# Patient Record
Sex: FEMALE | Race: WHITE | NOT HISPANIC OR LATINO
[De-identification: names, ages, dates, MRNs, and addresses within clinical notes are randomized per-mention and may not be internally consistent; named-entity substitution may affect disease eponyms.]

---

## 2017-06-19 PROBLEM — Z00.00 ENCOUNTER FOR PREVENTIVE HEALTH EXAMINATION: Status: ACTIVE | Noted: 2017-06-19

## 2017-08-01 ENCOUNTER — APPOINTMENT (OUTPATIENT)
Dept: OBGYN | Facility: CLINIC | Age: 72
End: 2017-08-01
Payer: MEDICARE

## 2017-08-01 VITALS
HEART RATE: 58 BPM | SYSTOLIC BLOOD PRESSURE: 138 MMHG | WEIGHT: 174 LBS | RESPIRATION RATE: 16 BRPM | TEMPERATURE: 98.1 F | DIASTOLIC BLOOD PRESSURE: 70 MMHG | BODY MASS INDEX: 29.71 KG/M2 | OXYGEN SATURATION: 98 % | HEIGHT: 64 IN

## 2017-08-01 DIAGNOSIS — I10 ESSENTIAL (PRIMARY) HYPERTENSION: ICD-10-CM

## 2017-08-01 DIAGNOSIS — E07.9 DISORDER OF THYROID, UNSPECIFIED: ICD-10-CM

## 2017-08-01 DIAGNOSIS — Z78.9 OTHER SPECIFIED HEALTH STATUS: ICD-10-CM

## 2017-08-01 DIAGNOSIS — Z85.3 PERSONAL HISTORY OF MALIGNANT NEOPLASM OF BREAST: ICD-10-CM

## 2017-08-01 DIAGNOSIS — F17.210 NICOTINE DEPENDENCE, CIGARETTES, UNCOMPLICATED: ICD-10-CM

## 2017-08-01 DIAGNOSIS — Z85.828 PERSONAL HISTORY OF OTHER MALIGNANT NEOPLASM OF SKIN: ICD-10-CM

## 2017-08-01 DIAGNOSIS — F19.90 OTHER PSYCHOACTIVE SUBSTANCE USE, UNSPECIFIED, UNCOMPLICATED: ICD-10-CM

## 2017-08-01 PROCEDURE — G0101: CPT

## 2017-08-01 PROCEDURE — 82270 OCCULT BLOOD FECES: CPT

## 2017-08-01 PROCEDURE — 81003 URINALYSIS AUTO W/O SCOPE: CPT | Mod: QW

## 2017-08-03 PROBLEM — I10 HIGH BLOOD PRESSURE: Status: ACTIVE | Noted: 2017-08-01

## 2017-08-03 PROBLEM — Z85.828 HISTORY OF MALIGNANT NEOPLASM OF SKIN: Status: RESOLVED | Noted: 2017-08-01 | Resolved: 2017-08-03

## 2017-08-03 PROBLEM — E07.9 THYROID DISEASE: Status: ACTIVE | Noted: 2017-08-01

## 2017-08-03 PROBLEM — F19.90 DRUG USE: Status: ACTIVE | Noted: 2017-08-01

## 2017-08-03 PROBLEM — Z78.9 NON-SMOKER: Status: ACTIVE | Noted: 2017-08-01

## 2017-08-03 PROBLEM — F17.210 CIGARETTE SMOKER: Status: ACTIVE | Noted: 2017-08-01

## 2017-08-03 PROBLEM — Z85.3 HISTORY OF MALIGNANT NEOPLASM OF BREAST: Status: RESOLVED | Noted: 2017-08-01 | Resolved: 2017-08-03

## 2017-08-27 LAB
BILIRUB UR QL STRIP: NORMAL
CLARITY UR: CLEAR
COLLECTION METHOD: NORMAL
CYTOLOGY CVX/VAG DOC THIN PREP: NORMAL
GLUCOSE UR-MCNC: NORMAL
HCG UR QL: 0.2 EU/DL
HGB UR QL STRIP.AUTO: NORMAL
KETONES UR-MCNC: NORMAL
LEUKOCYTE ESTERASE UR QL STRIP: NORMAL
NITRITE UR QL STRIP: NORMAL
PH UR STRIP: 7.5
PROT UR STRIP-MCNC: NORMAL
SP GR UR STRIP: 1.02

## 2017-09-27 ENCOUNTER — EMERGENCY (EMERGENCY)
Facility: HOSPITAL | Age: 72
LOS: 1 days | Discharge: HOME | End: 2017-09-27
Attending: EMERGENCY MEDICINE
Payer: SELF-PAY

## 2017-09-27 VITALS
WEIGHT: 175.05 LBS | HEIGHT: 64 IN | SYSTOLIC BLOOD PRESSURE: 152 MMHG | OXYGEN SATURATION: 96 % | HEART RATE: 60 BPM | RESPIRATION RATE: 18 BRPM | DIASTOLIC BLOOD PRESSURE: 80 MMHG | TEMPERATURE: 98 F

## 2017-09-27 PROCEDURE — 70450 CT HEAD/BRAIN W/O DYE: CPT | Mod: 26

## 2017-09-27 PROCEDURE — 99284 EMERGENCY DEPT VISIT MOD MDM: CPT | Mod: 25

## 2017-09-27 PROCEDURE — 72125 CT NECK SPINE W/O DYE: CPT | Mod: 26

## 2017-09-27 PROCEDURE — 99284 EMERGENCY DEPT VISIT MOD MDM: CPT

## 2017-09-27 PROCEDURE — 72125 CT NECK SPINE W/O DYE: CPT

## 2017-09-27 PROCEDURE — 70450 CT HEAD/BRAIN W/O DYE: CPT

## 2017-09-27 RX ORDER — IBUPROFEN 200 MG
600 TABLET ORAL ONCE
Qty: 0 | Refills: 0 | Status: COMPLETED | OUTPATIENT
Start: 2017-09-27 | End: 2017-09-27

## 2017-09-27 RX ADMIN — Medication 600 MILLIGRAM(S): at 20:20

## 2017-09-27 NOTE — ED ADULT TRIAGE NOTE - CHIEF COMPLAINT QUOTE
pt BIBA from urgent care, pt is AOX3, reports she was sitting in a chair and it broke. no loc, no thinners, c-collar cleared by ER MD on arrival. pt c/o of back pain. states she drove herself to urgent care and was brought to ED for eval.

## 2017-09-27 NOTE — ED STATDOCS - ATTENDING CONTRIBUTION TO CARE
I, Humble Do, performed the initial face to face bedside interview with this patient regarding history of present illness, review of symptoms and relevant past medical, social and family history.  I completed an independent physical examination.  I was the initial provider who evaluated this patient. I have signed out the follow up of any pending tests (i.e. labs, radiological studies) to the ACP.  I have communicated the patient’s plan of care and disposition with the ACP.

## 2017-09-27 NOTE — ED STATDOCS - OBJECTIVE STATEMENT
70 y/o M presents to the ED c/o back pain which onset at 1315. She describes her pain as a 7 or 8 out of 10. She states she fell at work and hit her head on the gym floor. She states they did not let her go home. She went to Urgent Care and the doctor told her to come here. She hit her head so hard she thinks she may have cracked her neck. They placed a C-collar on her. She states she has consistently had back problems. She had ankle spondylitis. She states she had a cervical injection 2 months ago. Pt denies fevers, chills, weakness, and LOC. No further complaints at this time.

## 2017-09-27 NOTE — ED STATDOCS - PROGRESS NOTE DETAILS
co pain to neck sp fall today. seen at [facility] and sent to er. pe lungs cta heart rrr cspine tender posteriorly, from with tenderness. nuero intact. ct no acute findings, rx motrin, fu pmd results given

## 2017-09-28 RX ORDER — IBUPROFEN 200 MG
1 TABLET ORAL
Qty: 15 | Refills: 0 | OUTPATIENT
Start: 2017-09-28 | End: 2017-10-02

## 2018-07-28 PROBLEM — Z78.9 ALCOHOL USE: Status: ACTIVE | Noted: 2017-08-01

## 2018-08-27 ENCOUNTER — APPOINTMENT (OUTPATIENT)
Dept: OBGYN | Facility: CLINIC | Age: 73
End: 2018-08-27
Payer: MEDICARE

## 2018-08-27 VITALS
HEIGHT: 64 IN | SYSTOLIC BLOOD PRESSURE: 180 MMHG | TEMPERATURE: 98.5 F | BODY MASS INDEX: 28.85 KG/M2 | RESPIRATION RATE: 16 BRPM | WEIGHT: 169 LBS | DIASTOLIC BLOOD PRESSURE: 60 MMHG

## 2018-08-27 VITALS — DIASTOLIC BLOOD PRESSURE: 72 MMHG | SYSTOLIC BLOOD PRESSURE: 160 MMHG

## 2018-08-27 DIAGNOSIS — Z85.3 PERSONAL HISTORY OF MALIGNANT NEOPLASM OF BREAST: ICD-10-CM

## 2018-08-27 LAB
BILIRUB UR QL STRIP: NORMAL
CLARITY UR: CLEAR
COLLECTION METHOD: NORMAL
GLUCOSE UR-MCNC: NORMAL
HCG UR QL: 0.2 EU/DL
HGB UR QL STRIP.AUTO: NORMAL
KETONES UR-MCNC: NORMAL
LEUKOCYTE ESTERASE UR QL STRIP: NORMAL
NITRITE UR QL STRIP: NORMAL
PH UR STRIP: 7.5
PROT UR STRIP-MCNC: 100
SP GR UR STRIP: 1.02

## 2018-08-27 PROCEDURE — G0101: CPT

## 2018-08-27 PROCEDURE — 82270 OCCULT BLOOD FECES: CPT

## 2018-08-27 PROCEDURE — 81003 URINALYSIS AUTO W/O SCOPE: CPT | Mod: QW

## 2018-08-30 LAB — CYTOLOGY CVX/VAG DOC THIN PREP: NORMAL

## 2019-04-22 ENCOUNTER — OUTPATIENT (OUTPATIENT)
Dept: OUTPATIENT SERVICES | Facility: HOSPITAL | Age: 74
LOS: 1 days | End: 2019-04-22
Payer: MEDICARE

## 2019-04-22 VITALS
WEIGHT: 172.84 LBS | DIASTOLIC BLOOD PRESSURE: 68 MMHG | SYSTOLIC BLOOD PRESSURE: 141 MMHG | HEIGHT: 60 IN | TEMPERATURE: 98 F | HEART RATE: 54 BPM | RESPIRATION RATE: 16 BRPM

## 2019-04-22 DIAGNOSIS — I10 ESSENTIAL (PRIMARY) HYPERTENSION: ICD-10-CM

## 2019-04-22 DIAGNOSIS — Z90.49 ACQUIRED ABSENCE OF OTHER SPECIFIED PARTS OF DIGESTIVE TRACT: Chronic | ICD-10-CM

## 2019-04-22 DIAGNOSIS — N32.1 VESICOINTESTINAL FISTULA: ICD-10-CM

## 2019-04-22 DIAGNOSIS — Z01.818 ENCOUNTER FOR OTHER PREPROCEDURAL EXAMINATION: ICD-10-CM

## 2019-04-22 DIAGNOSIS — Z29.9 ENCOUNTER FOR PROPHYLACTIC MEASURES, UNSPECIFIED: ICD-10-CM

## 2019-04-22 DIAGNOSIS — Z98.890 OTHER SPECIFIED POSTPROCEDURAL STATES: Chronic | ICD-10-CM

## 2019-04-22 DIAGNOSIS — Z90.710 ACQUIRED ABSENCE OF BOTH CERVIX AND UTERUS: Chronic | ICD-10-CM

## 2019-04-22 LAB
ANION GAP SERPL CALC-SCNC: 13 MMOL/L — SIGNIFICANT CHANGE UP (ref 5–17)
BLD GP AB SCN SERPL QL: SIGNIFICANT CHANGE UP
BUN SERPL-MCNC: 13 MG/DL — SIGNIFICANT CHANGE UP (ref 8–20)
CALCIUM SERPL-MCNC: 9.4 MG/DL — SIGNIFICANT CHANGE UP (ref 8.6–10.2)
CHLORIDE SERPL-SCNC: 98 MMOL/L — SIGNIFICANT CHANGE UP (ref 98–107)
CO2 SERPL-SCNC: 25 MMOL/L — SIGNIFICANT CHANGE UP (ref 22–29)
CREAT SERPL-MCNC: 0.91 MG/DL — SIGNIFICANT CHANGE UP (ref 0.5–1.3)
GLUCOSE SERPL-MCNC: 110 MG/DL — SIGNIFICANT CHANGE UP (ref 70–115)
HBA1C BLD-MCNC: 6.7 % — HIGH (ref 4–5.6)
HCT VFR BLD CALC: 38 % — SIGNIFICANT CHANGE UP (ref 37–47)
HGB BLD-MCNC: 12.5 G/DL — SIGNIFICANT CHANGE UP (ref 12–16)
MCHC RBC-ENTMCNC: 27.8 PG — SIGNIFICANT CHANGE UP (ref 27–31)
MCHC RBC-ENTMCNC: 32.9 G/DL — SIGNIFICANT CHANGE UP (ref 32–36)
MCV RBC AUTO: 84.6 FL — SIGNIFICANT CHANGE UP (ref 81–99)
MRSA PCR RESULT.: SIGNIFICANT CHANGE UP
PLATELET # BLD AUTO: 284 K/UL — SIGNIFICANT CHANGE UP (ref 150–400)
POTASSIUM SERPL-MCNC: 4.9 MMOL/L — SIGNIFICANT CHANGE UP (ref 3.5–5.3)
POTASSIUM SERPL-SCNC: 4.9 MMOL/L — SIGNIFICANT CHANGE UP (ref 3.5–5.3)
RBC # BLD: 4.49 M/UL — SIGNIFICANT CHANGE UP (ref 4.4–5.2)
RBC # FLD: 14.5 % — SIGNIFICANT CHANGE UP (ref 11–15.6)
S AUREUS DNA NOSE QL NAA+PROBE: SIGNIFICANT CHANGE UP
SODIUM SERPL-SCNC: 136 MMOL/L — SIGNIFICANT CHANGE UP (ref 135–145)
TYPE + AB SCN PNL BLD: SIGNIFICANT CHANGE UP
WBC # BLD: 6.8 K/UL — SIGNIFICANT CHANGE UP (ref 4.8–10.8)
WBC # FLD AUTO: 6.8 K/UL — SIGNIFICANT CHANGE UP (ref 4.8–10.8)

## 2019-04-22 PROCEDURE — 83036 HEMOGLOBIN GLYCOSYLATED A1C: CPT

## 2019-04-22 PROCEDURE — 86900 BLOOD TYPING SEROLOGIC ABO: CPT

## 2019-04-22 PROCEDURE — 36415 COLL VENOUS BLD VENIPUNCTURE: CPT

## 2019-04-22 PROCEDURE — 87640 STAPH A DNA AMP PROBE: CPT

## 2019-04-22 PROCEDURE — 93005 ELECTROCARDIOGRAM TRACING: CPT

## 2019-04-22 PROCEDURE — 85027 COMPLETE CBC AUTOMATED: CPT

## 2019-04-22 PROCEDURE — 87641 MR-STAPH DNA AMP PROBE: CPT

## 2019-04-22 PROCEDURE — 86850 RBC ANTIBODY SCREEN: CPT

## 2019-04-22 PROCEDURE — G0463: CPT

## 2019-04-22 PROCEDURE — 80048 BASIC METABOLIC PNL TOTAL CA: CPT

## 2019-04-22 PROCEDURE — 93010 ELECTROCARDIOGRAM REPORT: CPT

## 2019-04-22 PROCEDURE — 86901 BLOOD TYPING SEROLOGIC RH(D): CPT

## 2019-04-22 RX ORDER — SODIUM CHLORIDE 9 MG/ML
3 INJECTION INTRAMUSCULAR; INTRAVENOUS; SUBCUTANEOUS EVERY 8 HOURS
Qty: 0 | Refills: 0 | Status: DISCONTINUED | OUTPATIENT
Start: 2019-04-30 | End: 2019-05-03

## 2019-04-22 RX ORDER — CEFOTETAN DISODIUM 1 G
2 VIAL (EA) INJECTION ONCE
Qty: 0 | Refills: 0 | Status: COMPLETED | OUTPATIENT
Start: 2019-04-30 | End: 2019-04-30

## 2019-04-22 NOTE — PATIENT PROFILE ADULT - NSPROEDALEARNPREF_GEN_A_NUR
verbal instruction/presurgical , surgical scrub instructions, pain management education given - verbalized understanding/written material

## 2019-04-22 NOTE — H&P PST ADULT - NSICDXPROBLEM_GEN_ALL_CORE_FT
PROBLEM DIAGNOSES  Problem: Need for prophylactic measure  Assessment and Plan: Caprini Score 7 High risk,  Surgical team should assess /Strongly recommend pharmacological and mechanical measures for VTE prophylaxis     Problem: Hypertension  Assessment and Plan: continue medications as instructed. Asked the patient to take the Blood pressure medication/ heart medication on DOP.     Problem: [place]-vesical fistula  Assessment and Plan: Laparoscopic possible open resection of colovesical fistula and repair of umbilical hernia with Parietex mesh and all related procedures. insertion of perioperative bilateral ureteral stents. Medical Clearance pending

## 2019-04-22 NOTE — H&P PST ADULT - NSICDXPASTMEDICALHX_GEN_ALL_CORE_FT
PAST MEDICAL HISTORY:  Breast cancer 2006, surgery, radiation and hormone therapy    Hypertension     Hypothyroidism     Prediabetes

## 2019-04-22 NOTE — H&P PST ADULT - HISTORY OF PRESENT ILLNESS
73 maite who states that she was having frequent UTI in the past and her urologist ran some tests which showed she has a colo vesicular fistula

## 2019-04-22 NOTE — H&P PST ADULT - ASSESSMENT
medications reviewed, instructions given on what medications to take and what not to take. Asked the patient to take the Blood pressure medication/ heart medication on DOP.   CAPRINI VTE 2.0 SCORE [CLOT updated 2019]    AGE RELATED RISK FACTORS                                                       MOBILITY RELATED FACTORS  [ ] Age 41-60 years                                            (1 Point)                    [ ] Bed rest                                                        (1 Point)  [ ] Age: 61-74 years                                           (2 Points)                  [ ] Plaster cast                                                   (2 Points)  [x ] Age= 75 years                                              (3 Points)                    [ ] Bed bound for more than 72 hours                 (2 Points)    DISEASE RELATED RISK FACTORS                                               GENDER SPECIFIC FACTORS  [ ] Edema in the lower extremities                       (1 Point)              [ ] Pregnancy                                                     (1 Point)  [x ] Varicose veins                                               (1 Point)                     [ ] Post-partum < 6 weeks                                   (1 Point)             [x ] BMI > 25 Kg/m2                                            (1 Point)                     [ ] Hormonal therapy  or oral contraception          (1 Point)                 [ ] Sepsis (in the previous month)                        (1 Point)               [ ] History of pregnancy complications                 (1 point)  [ ] Pneumonia or serious lung disease                                               [ ] Unexplained or recurrent                     (1 Point)           (in the previous month)                               (1 Point)  [ ] Abnormal pulmonary function test                     (1 Point)                 SURGERY RELATED RISK FACTORS  [ ] Acute myocardial infarction                              (1 Point)               [ ]  Section                                             (1 Point)  [ ] Congestive heart failure (in the previous month)  (1 Point)      [ ] Minor surgery                                                  (1 Point)   [ ] Inflammatory bowel disease                             (1 Point)               [ ] Arthroscopic surgery                                        (2 Points)  [ ] Central venous access                                      (2 Points)                [ x] General surgery lasting more than 45 minutes (2 points)  [ ] Malignancy- Present or previous                   (2 Points)                [ ] Elective arthroplasty                                         (5 points)    [ ] Stroke (in the previous month)                          (5 Points)                                                                                                                                                           HEMATOLOGY RELATED FACTORS                                                 TRAUMA RELATED RISK FACTORS  [ ] Prior episodes of VTE                                     (3 Points)                [ ] Fracture of the hip, pelvis, or leg                       (5 Points)  [ ] Positive family history for VTE                         (3 Points)             [ ] Acute spinal cord injury (in the previous month)  (5 Points)  [ ] Prothrombin 57729 A                                     (3 Points)               [ ] Paralysis  (less than 1 month)                             (5 Points)  [ ] Factor V Leiden                                             (3 Points)                  [ ] Multiple Trauma within 1 month                        (5 Points)  [ ] Lupus anticoagulants                                     (3 Points)                                                           [ ] Anticardiolipin antibodies                               (3 Points)                                                       [ ] High homocysteine in the blood                      (3 Points)                                             [ ] Other congenital or acquired thrombophilia      (3 Points)                                                [ ] Heparin induced thrombocytopenia                  (3 Points)                                     Total Score [    7      ]  OPIOID RISK TOOL    YARON EACH BOX THAT APPLIES AND ADD TOTALS AT THE END    FAMILY HISTORY OF SUBSTANCE ABUSE                 FEMALE         MALE                                                Alcohol                             [  ]1 pt          [  ]3pts                                               Illegal Drugs                     [  ]2 pts        [  ]3pts                                               Rx Drugs                           [  ]4 pts        [  ]4 pts    PERSONAL HISTORY OF SUBSTANCE ABUSE                                                                                          Alcohol                             [  ]3 pts       [  ]3 pts                                               Illegal Drugs                     [  ]4 pts        [  ]4 pts                                               Rx Drugs                           [  ]5 pts        [  ]5 pts    AGE BETWEEN 16-45 YEARS                                      [  ]1 pt         [  ]1 pt    HISTORY OF PREADOLESCENT   SEXUAL ABUSE                                                             [ x ]3 pts        [  ]0pts    PSYCHOLOGICAL DISEASE                     ADD, OCD, Bipolar, Schizophrenia        [  ]2 pts         [  ]2 pts                      Depression                                               [  ]1 pt           [  ]1 pt           SCORING TOTAL   (add numbers and type here)              (3  )                                     A score of 3 or lower indicated LOW risk for future opioid abuse  A score of 4 to 7 indicated moderate risk for future opioid abuse  A score of 8 or higher indicates a high risk for opioid abuse

## 2019-04-22 NOTE — H&P PST ADULT - NSICDXPASTSURGICALHX_GEN_ALL_CORE_FT
PAST SURGICAL HISTORY:  H/O laminectomy lumbar 1992    H/O total hysterectomy 1987    History of cholecystectomy 1979    History of lumpectomy left 2006

## 2019-04-29 ENCOUNTER — TRANSCRIPTION ENCOUNTER (OUTPATIENT)
Age: 74
End: 2019-04-29

## 2019-04-30 ENCOUNTER — INPATIENT (INPATIENT)
Facility: HOSPITAL | Age: 74
LOS: 2 days | Discharge: HOME | DRG: 330 | End: 2019-05-03
Attending: SURGERY | Admitting: SURGERY
Payer: MEDICARE

## 2019-04-30 ENCOUNTER — RESULT REVIEW (OUTPATIENT)
Age: 74
End: 2019-04-30

## 2019-04-30 VITALS
OXYGEN SATURATION: 97 % | RESPIRATION RATE: 16 BRPM | TEMPERATURE: 98 F | HEIGHT: 60 IN | DIASTOLIC BLOOD PRESSURE: 79 MMHG | WEIGHT: 172.84 LBS | HEART RATE: 62 BPM | SYSTOLIC BLOOD PRESSURE: 149 MMHG

## 2019-04-30 DIAGNOSIS — Z90.710 ACQUIRED ABSENCE OF BOTH CERVIX AND UTERUS: Chronic | ICD-10-CM

## 2019-04-30 DIAGNOSIS — Z90.49 ACQUIRED ABSENCE OF OTHER SPECIFIED PARTS OF DIGESTIVE TRACT: Chronic | ICD-10-CM

## 2019-04-30 DIAGNOSIS — Z98.890 OTHER SPECIFIED POSTPROCEDURAL STATES: Chronic | ICD-10-CM

## 2019-04-30 DIAGNOSIS — N32.1 VESICOINTESTINAL FISTULA: ICD-10-CM

## 2019-04-30 LAB
ABO RH CONFIRMATION: SIGNIFICANT CHANGE UP
GLUCOSE BLDC GLUCOMTR-MCNC: 153 MG/DL — HIGH (ref 70–99)
GLUCOSE BLDC GLUCOMTR-MCNC: 160 MG/DL — HIGH (ref 70–99)
GLUCOSE BLDC GLUCOMTR-MCNC: 197 MG/DL — HIGH (ref 70–99)

## 2019-04-30 PROCEDURE — 88304 TISSUE EXAM BY PATHOLOGIST: CPT | Mod: 26

## 2019-04-30 PROCEDURE — 88307 TISSUE EXAM BY PATHOLOGIST: CPT | Mod: 26

## 2019-04-30 PROCEDURE — 88302 TISSUE EXAM BY PATHOLOGIST: CPT | Mod: 26

## 2019-04-30 RX ORDER — ALVIMOPAN 12 MG/1
12 CAPSULE ORAL ONCE
Qty: 0 | Refills: 0 | Status: COMPLETED | OUTPATIENT
Start: 2019-04-30 | End: 2019-04-30

## 2019-04-30 RX ORDER — FENTANYL CITRATE 50 UG/ML
25 INJECTION INTRAVENOUS
Qty: 0 | Refills: 0 | Status: DISCONTINUED | OUTPATIENT
Start: 2019-04-30 | End: 2019-04-30

## 2019-04-30 RX ORDER — ATENOLOL 25 MG/1
50 TABLET ORAL EVERY 12 HOURS
Qty: 0 | Refills: 0 | Status: DISCONTINUED | OUTPATIENT
Start: 2019-05-01 | End: 2019-05-03

## 2019-04-30 RX ORDER — ONDANSETRON 8 MG/1
4 TABLET, FILM COATED ORAL EVERY 6 HOURS
Qty: 0 | Refills: 0 | Status: DISCONTINUED | OUTPATIENT
Start: 2019-04-30 | End: 2019-05-03

## 2019-04-30 RX ORDER — SODIUM CHLORIDE 9 MG/ML
1000 INJECTION, SOLUTION INTRAVENOUS
Qty: 0 | Refills: 0 | Status: DISCONTINUED | OUTPATIENT
Start: 2019-04-30 | End: 2019-05-03

## 2019-04-30 RX ORDER — NALOXONE HYDROCHLORIDE 4 MG/.1ML
0.1 SPRAY NASAL
Qty: 0 | Refills: 0 | Status: DISCONTINUED | OUTPATIENT
Start: 2019-04-30 | End: 2019-05-03

## 2019-04-30 RX ORDER — DEXTROSE 50 % IN WATER 50 %
25 SYRINGE (ML) INTRAVENOUS ONCE
Qty: 0 | Refills: 0 | Status: DISCONTINUED | OUTPATIENT
Start: 2019-04-30 | End: 2019-05-03

## 2019-04-30 RX ORDER — BUPIVACAINE 13.3 MG/ML
20 INJECTION, SUSPENSION, LIPOSOMAL INFILTRATION ONCE
Qty: 0 | Refills: 0 | Status: DISCONTINUED | OUTPATIENT
Start: 2019-04-30 | End: 2019-04-30

## 2019-04-30 RX ORDER — DEXTROSE 50 % IN WATER 50 %
15 SYRINGE (ML) INTRAVENOUS ONCE
Qty: 0 | Refills: 0 | Status: DISCONTINUED | OUTPATIENT
Start: 2019-04-30 | End: 2019-05-03

## 2019-04-30 RX ORDER — ATENOLOL 25 MG/1
100 TABLET ORAL ONCE
Qty: 0 | Refills: 0 | Status: COMPLETED | OUTPATIENT
Start: 2019-04-30 | End: 2019-04-30

## 2019-04-30 RX ORDER — ENOXAPARIN SODIUM 100 MG/ML
40 INJECTION SUBCUTANEOUS DAILY
Qty: 0 | Refills: 0 | Status: DISCONTINUED | OUTPATIENT
Start: 2019-05-01 | End: 2019-05-03

## 2019-04-30 RX ORDER — ZOLPIDEM TARTRATE 10 MG/1
5 TABLET ORAL AT BEDTIME
Qty: 0 | Refills: 0 | Status: DISCONTINUED | OUTPATIENT
Start: 2019-04-30 | End: 2019-05-03

## 2019-04-30 RX ORDER — SODIUM CHLORIDE 9 MG/ML
1000 INJECTION, SOLUTION INTRAVENOUS
Qty: 0 | Refills: 0 | Status: DISCONTINUED | OUTPATIENT
Start: 2019-04-30 | End: 2019-04-30

## 2019-04-30 RX ORDER — ONDANSETRON 8 MG/1
4 TABLET, FILM COATED ORAL ONCE
Qty: 0 | Refills: 0 | Status: DISCONTINUED | OUTPATIENT
Start: 2019-04-30 | End: 2019-04-30

## 2019-04-30 RX ORDER — ATORVASTATIN CALCIUM 80 MG/1
40 TABLET, FILM COATED ORAL AT BEDTIME
Qty: 0 | Refills: 0 | Status: DISCONTINUED | OUTPATIENT
Start: 2019-04-30 | End: 2019-05-03

## 2019-04-30 RX ORDER — FENTANYL CITRATE 50 UG/ML
30 INJECTION INTRAVENOUS
Qty: 0 | Refills: 0 | Status: DISCONTINUED | OUTPATIENT
Start: 2019-04-30 | End: 2019-05-02

## 2019-04-30 RX ORDER — GLUCAGON INJECTION, SOLUTION 0.5 MG/.1ML
1 INJECTION, SOLUTION SUBCUTANEOUS ONCE
Qty: 0 | Refills: 0 | Status: DISCONTINUED | OUTPATIENT
Start: 2019-04-30 | End: 2019-05-03

## 2019-04-30 RX ORDER — INSULIN GLARGINE 100 [IU]/ML
15 INJECTION, SOLUTION SUBCUTANEOUS AT BEDTIME
Qty: 0 | Refills: 0 | Status: DISCONTINUED | OUTPATIENT
Start: 2019-04-30 | End: 2019-05-03

## 2019-04-30 RX ORDER — HYDRALAZINE HCL 50 MG
25 TABLET ORAL THREE TIMES A DAY
Qty: 0 | Refills: 0 | Status: DISCONTINUED | OUTPATIENT
Start: 2019-04-30 | End: 2019-05-03

## 2019-04-30 RX ORDER — DEXTROSE 50 % IN WATER 50 %
12.5 SYRINGE (ML) INTRAVENOUS ONCE
Qty: 0 | Refills: 0 | Status: DISCONTINUED | OUTPATIENT
Start: 2019-04-30 | End: 2019-05-03

## 2019-04-30 RX ORDER — LACTOBACILLUS ACIDOPHILUS 100MM CELL
1 CAPSULE ORAL
Qty: 0 | Refills: 0 | Status: DISCONTINUED | OUTPATIENT
Start: 2019-05-01 | End: 2019-05-03

## 2019-04-30 RX ORDER — ALVIMOPAN 12 MG/1
12 CAPSULE ORAL
Qty: 0 | Refills: 0 | Status: DISCONTINUED | OUTPATIENT
Start: 2019-05-01 | End: 2019-05-03

## 2019-04-30 RX ORDER — INSULIN LISPRO 100/ML
VIAL (ML) SUBCUTANEOUS
Qty: 0 | Refills: 0 | Status: DISCONTINUED | OUTPATIENT
Start: 2019-04-30 | End: 2019-05-03

## 2019-04-30 RX ORDER — LEVOTHYROXINE SODIUM 125 MCG
75 TABLET ORAL DAILY
Qty: 0 | Refills: 0 | Status: DISCONTINUED | OUTPATIENT
Start: 2019-04-30 | End: 2019-05-03

## 2019-04-30 RX ADMIN — FENTANYL CITRATE 30 MILLILITER(S): 50 INJECTION INTRAVENOUS at 17:04

## 2019-04-30 RX ADMIN — SODIUM CHLORIDE 3 MILLILITER(S): 9 INJECTION INTRAMUSCULAR; INTRAVENOUS; SUBCUTANEOUS at 20:57

## 2019-04-30 RX ADMIN — ATENOLOL 100 MILLIGRAM(S): 25 TABLET ORAL at 23:39

## 2019-04-30 RX ADMIN — ALVIMOPAN 12 MILLIGRAM(S): 12 CAPSULE ORAL at 08:56

## 2019-04-30 RX ADMIN — ONDANSETRON 4 MILLIGRAM(S): 8 TABLET, FILM COATED ORAL at 21:00

## 2019-04-30 RX ADMIN — ATORVASTATIN CALCIUM 40 MILLIGRAM(S): 80 TABLET, FILM COATED ORAL at 21:00

## 2019-04-30 RX ADMIN — FENTANYL CITRATE 30 MILLILITER(S): 50 INJECTION INTRAVENOUS at 19:56

## 2019-04-30 RX ADMIN — ZOLPIDEM TARTRATE 5 MILLIGRAM(S): 10 TABLET ORAL at 23:39

## 2019-04-30 NOTE — CONSULT NOTE ADULT - SUBJECTIVE AND OBJECTIVE BOX
HPI:  73 uyof who states that she was having frequent UTI in the past and her urologist ran some tests which showed she has a colo vesicular fistula (22 Apr 2019 12:16)    Home Medications:   · 	dilTIAZem 240 mg/24 hours oral tablet, extended release: Last Dose Taken:  , 1 tab(s) orally once a day  · 	atenolol 100 mg oral tablet: Last Dose Taken:  , 1 tab(s) orally once a day  · 	Crestor 40 mg oral tablet: Last Dose Taken:  , 1 tab(s) orally once a day (at bedtime)  · 	hydroCHLOROthiazide 25 mg oral tablet: Last Dose Taken:  , 1 tab(s) orally once a day  · 	Ambien CR 12.5 mg oral tablet, extended release: Last Dose Taken:  , 1 tab(s) orally once a day (at bedtime)  · 	Kombiglyze XR 5 mg-1000 mg oral tablet, extended release: Last Dose Taken:  , 1 tab(s) orally once a day (in the evening)  · 	levothyroxine 75 mcg (0.075 mg) oral tablet: 1 tab(s) orally once a day  · 	potassium chloride 10 mEq oral tablet, extended release: 1 tab(s) orally once a day  · 	Neurontin 300 mg oral capsule: 1 cap(s) orally 3 times a day  · 	NexIUM 40 mg oral delayed release capsule: 1 cap(s) orally once a day  · 	traMADol 50 mg oral tablet: 1 tab(s) orally every 4 hours, As Needed          · 	Remicade 100 mg intravenous injection: intravenous every 3 months      PAST MEDICAL & SURGICAL HISTORY:  Hypothyroidism  Breast cancer: 2006, surgery, radiation and hormone therapy  Prediabetes  Hypertension  History of lumpectomy: left 2006  H/O laminectomy: lumbar 1992  H/O total hysterectomy: 1987  History of cholecystectomy: 1979    ATENolol  Tablet 100 milliGRAM(s) Oral once  atorvastatin 40 milliGRAM(s) Oral at bedtime  cefoTEtan  IVPB 2 Gram(s) IV Intermittent once  fentaNYL PCA (50 MICROgram(s)/mL) 30 milliLiter(s) PCA Continuous PCA Continuous  lactated ringers. 1000 milliLiter(s) IV Continuous <Continuous>  levothyroxine 75 MICROGram(s) Oral daily  naloxone Injectable 0.1 milliGRAM(s) IV Push every 3 minutes PRN  ondansetron Injectable 4 milliGRAM(s) IV Push every 6 hours PRN  sodium chloride 0.9% lock flush 3 milliLiter(s) IV Push every 8 hours    MEDICATIONS  (STANDING):  ATENolol  Tablet 100 milliGRAM(s) Oral once  atorvastatin 40 milliGRAM(s) Oral at bedtime  cefoTEtan  IVPB 2 Gram(s) IV Intermittent once  fentaNYL PCA (50 MICROgram(s)/mL) 30 milliLiter(s) PCA Continuous PCA Continuous  lactated ringers. 1000 milliLiter(s) (100 mL/Hr) IV Continuous <Continuous>  levothyroxine 75 MICROGram(s) Oral daily  sodium chloride 0.9% lock flush 3 milliLiter(s) IV Push every 8 hours    MEDICATIONS  (PRN):  naloxone Injectable 0.1 milliGRAM(s) IV Push every 3 minutes PRN For ANY of the following changes in patient status:  A. RR LESS THAN 10 breaths per minute, B. Oxygen saturation LESS THAN 90%, C. Sedation score of 6  ondansetron Injectable 4 milliGRAM(s) IV Push every 6 hours PRN Nausea      Allergies    ACE inhibitors (Angioedema)  morphine (Hives)    Intolerances        SOCIAL HISTORY:  No S/D/IVDU    FAMILY HISTORY:  Family history of bone cancer  FH: heart disease  FH: Parkinson's disease        ROS  - Headache  - Neck Stiffness  - Chest Pain  - SOB  mild  Abd pain  - Pelvic Pain  - Leg Pain    Vital Signs Last 24 Hrs  T(C): 36.8 (30 Apr 2019 20:00), Max: 36.8 (30 Apr 2019 08:26)  T(F): 98.2 (30 Apr 2019 20:00), Max: 98.3 (30 Apr 2019 18:15)  HR: 88 (30 Apr 2019 20:00) (62 - 88)  BP: 163/75 (30 Apr 2019 20:00) (132/53 - 169/67)  BP(mean): --  RR: 18 (30 Apr 2019 20:00) (8 - 23)  SpO2: 100% (30 Apr 2019 19:10) (93% - 100%)    HEENT: PEARLA  Neck: Supple  Cardio: S1 S2 No Murmur  Pulm: CTA No Rales or Ronchi  Abd: Soft NT ND BS dec Incision bandage clean  Rectal Plevic Breast- refused  Ext: No DCT  Skin: No Rash  Neuro: Awake Pleasant      Diverticulitis - Post op pain control ambulation  Hypothyroidism - syhthoid  Breast cancer: 2006, surgery, radiation and hormone therapy  Prediabetes - SS acchecks  Hypertension - atenolol with PRN doses

## 2019-04-30 NOTE — BRIEF OPERATIVE NOTE - NSICDXBRIEFPOSTOP_GEN_ALL_CORE_FT
POST-OP DIAGNOSIS:  Colovesical fistula 30-Apr-2019 16:14:23  James Pineda  Diverticulitis of colon 30-Apr-2019 16:14:11  James Pineda

## 2019-04-30 NOTE — BRIEF OPERATIVE NOTE - NSICDXBRIEFPROCEDURE_GEN_ALL_CORE_FT
PROCEDURES:  Laparoscopic repair of colovesical fistula with sigmoid colectomy 30-Apr-2019 16:08:57  James Pineda

## 2019-04-30 NOTE — PROGRESS NOTE ADULT - ASSESSMENT
Assessment: 73 year old female s/p Laparoscopic repair of colovesical fistula, UHR, bilateral ureteral stents and sigmoid resection    Plan:  pain control with PCA  CLD  PRN zofran for nausea  IVF  Lovenox for DVT ppx  Encourage OOB as much as possible  AM labs

## 2019-05-01 LAB
ANION GAP SERPL CALC-SCNC: 14 MMOL/L — SIGNIFICANT CHANGE UP (ref 5–17)
ANISOCYTOSIS BLD QL: SLIGHT — SIGNIFICANT CHANGE UP
BUN SERPL-MCNC: 12 MG/DL — SIGNIFICANT CHANGE UP (ref 8–20)
CALCIUM SERPL-MCNC: 8.2 MG/DL — LOW (ref 8.6–10.2)
CHLORIDE SERPL-SCNC: 96 MMOL/L — LOW (ref 98–107)
CO2 SERPL-SCNC: 26 MMOL/L — SIGNIFICANT CHANGE UP (ref 22–29)
CREAT SERPL-MCNC: 0.8 MG/DL — SIGNIFICANT CHANGE UP (ref 0.5–1.3)
GLUCOSE BLDC GLUCOMTR-MCNC: 119 MG/DL — HIGH (ref 70–99)
GLUCOSE BLDC GLUCOMTR-MCNC: 135 MG/DL — HIGH (ref 70–99)
GLUCOSE BLDC GLUCOMTR-MCNC: 146 MG/DL — HIGH (ref 70–99)
GLUCOSE BLDC GLUCOMTR-MCNC: 153 MG/DL — HIGH (ref 70–99)
GLUCOSE BLDC GLUCOMTR-MCNC: 192 MG/DL — HIGH (ref 70–99)
GLUCOSE SERPL-MCNC: 149 MG/DL — HIGH (ref 70–115)
HCT VFR BLD CALC: 32.2 % — LOW (ref 37–47)
HGB BLD-MCNC: 10.5 G/DL — LOW (ref 12–16)
HYPOCHROMIA BLD QL: SLIGHT — SIGNIFICANT CHANGE UP
LYMPHOCYTES # BLD AUTO: 10 % — LOW (ref 20–55)
MAGNESIUM SERPL-MCNC: 1.4 MG/DL — LOW (ref 1.8–2.6)
MCHC RBC-ENTMCNC: 27.3 PG — SIGNIFICANT CHANGE UP (ref 27–31)
MCHC RBC-ENTMCNC: 32.6 G/DL — SIGNIFICANT CHANGE UP (ref 32–36)
MCV RBC AUTO: 83.9 FL — SIGNIFICANT CHANGE UP (ref 81–99)
MICROCYTES BLD QL: SLIGHT — SIGNIFICANT CHANGE UP
MONOCYTES NFR BLD AUTO: 12 % — HIGH (ref 3–10)
NEUTROPHILS NFR BLD AUTO: 78 % — HIGH (ref 37–73)
PHOSPHATE SERPL-MCNC: 4 MG/DL — SIGNIFICANT CHANGE UP (ref 2.4–4.7)
PLAT MORPH BLD: NORMAL — SIGNIFICANT CHANGE UP
PLATELET # BLD AUTO: 300 K/UL — SIGNIFICANT CHANGE UP (ref 150–400)
POIKILOCYTOSIS BLD QL AUTO: SLIGHT — SIGNIFICANT CHANGE UP
POTASSIUM SERPL-MCNC: 3.8 MMOL/L — SIGNIFICANT CHANGE UP (ref 3.5–5.3)
POTASSIUM SERPL-SCNC: 3.8 MMOL/L — SIGNIFICANT CHANGE UP (ref 3.5–5.3)
RBC # BLD: 3.84 M/UL — LOW (ref 4.4–5.2)
RBC # FLD: 14.8 % — SIGNIFICANT CHANGE UP (ref 11–15.6)
RBC BLD AUTO: ABNORMAL
SODIUM SERPL-SCNC: 136 MMOL/L — SIGNIFICANT CHANGE UP (ref 135–145)
WBC # BLD: 16.7 K/UL — HIGH (ref 4.8–10.8)
WBC # FLD AUTO: 16.7 K/UL — HIGH (ref 4.8–10.8)

## 2019-05-01 RX ORDER — MAGNESIUM SULFATE 500 MG/ML
1 VIAL (ML) INJECTION ONCE
Qty: 0 | Refills: 0 | Status: COMPLETED | OUTPATIENT
Start: 2019-05-01 | End: 2019-05-01

## 2019-05-01 RX ADMIN — ZOLPIDEM TARTRATE 5 MILLIGRAM(S): 10 TABLET ORAL at 21:25

## 2019-05-01 RX ADMIN — ATENOLOL 50 MILLIGRAM(S): 25 TABLET ORAL at 06:58

## 2019-05-01 RX ADMIN — Medication 75 MICROGRAM(S): at 06:58

## 2019-05-01 RX ADMIN — ATORVASTATIN CALCIUM 40 MILLIGRAM(S): 80 TABLET, FILM COATED ORAL at 21:25

## 2019-05-01 RX ADMIN — Medication 1 TABLET(S): at 06:58

## 2019-05-01 RX ADMIN — ENOXAPARIN SODIUM 40 MILLIGRAM(S): 100 INJECTION SUBCUTANEOUS at 11:20

## 2019-05-01 RX ADMIN — SODIUM CHLORIDE 3 MILLILITER(S): 9 INJECTION INTRAMUSCULAR; INTRAVENOUS; SUBCUTANEOUS at 21:11

## 2019-05-01 RX ADMIN — ALVIMOPAN 12 MILLIGRAM(S): 12 CAPSULE ORAL at 06:58

## 2019-05-01 RX ADMIN — Medication 1 TABLET(S): at 18:43

## 2019-05-01 RX ADMIN — SODIUM CHLORIDE 3 MILLILITER(S): 9 INJECTION INTRAMUSCULAR; INTRAVENOUS; SUBCUTANEOUS at 06:57

## 2019-05-01 RX ADMIN — ALVIMOPAN 12 MILLIGRAM(S): 12 CAPSULE ORAL at 18:44

## 2019-05-01 RX ADMIN — SODIUM CHLORIDE 3 MILLILITER(S): 9 INJECTION INTRAMUSCULAR; INTRAVENOUS; SUBCUTANEOUS at 16:06

## 2019-05-01 RX ADMIN — Medication 100 GRAM(S): at 11:21

## 2019-05-01 RX ADMIN — ATENOLOL 50 MILLIGRAM(S): 25 TABLET ORAL at 18:44

## 2019-05-01 RX ADMIN — INSULIN GLARGINE 15 UNIT(S): 100 INJECTION, SOLUTION SUBCUTANEOUS at 21:26

## 2019-05-01 RX ADMIN — Medication 1: at 12:44

## 2019-05-01 RX ADMIN — FENTANYL CITRATE 30 MILLILITER(S): 50 INJECTION INTRAVENOUS at 07:56

## 2019-05-02 LAB
ANION GAP SERPL CALC-SCNC: 10 MMOL/L — SIGNIFICANT CHANGE UP (ref 5–17)
BUN SERPL-MCNC: 8 MG/DL — SIGNIFICANT CHANGE UP (ref 8–20)
CALCIUM SERPL-MCNC: 8.7 MG/DL — SIGNIFICANT CHANGE UP (ref 8.6–10.2)
CHLORIDE SERPL-SCNC: 97 MMOL/L — LOW (ref 98–107)
CO2 SERPL-SCNC: 30 MMOL/L — HIGH (ref 22–29)
CREAT SERPL-MCNC: 0.62 MG/DL — SIGNIFICANT CHANGE UP (ref 0.5–1.3)
GLUCOSE BLDC GLUCOMTR-MCNC: 101 MG/DL — HIGH (ref 70–99)
GLUCOSE BLDC GLUCOMTR-MCNC: 126 MG/DL — HIGH (ref 70–99)
GLUCOSE SERPL-MCNC: 126 MG/DL — HIGH (ref 70–115)
HCT VFR BLD CALC: 30 % — LOW (ref 37–47)
HGB BLD-MCNC: 9.8 G/DL — LOW (ref 12–16)
MAGNESIUM SERPL-MCNC: 1.5 MG/DL — LOW (ref 1.6–2.6)
MAGNESIUM SERPL-MCNC: 1.6 MG/DL — SIGNIFICANT CHANGE UP (ref 1.6–2.6)
MCHC RBC-ENTMCNC: 27.9 PG — SIGNIFICANT CHANGE UP (ref 27–31)
MCHC RBC-ENTMCNC: 32.7 G/DL — SIGNIFICANT CHANGE UP (ref 32–36)
MCV RBC AUTO: 85.5 FL — SIGNIFICANT CHANGE UP (ref 81–99)
PHOSPHATE SERPL-MCNC: 1.9 MG/DL — LOW (ref 2.4–4.7)
PHOSPHATE SERPL-MCNC: 2.9 MG/DL — SIGNIFICANT CHANGE UP (ref 2.4–4.7)
PLATELET # BLD AUTO: 266 K/UL — SIGNIFICANT CHANGE UP (ref 150–400)
POTASSIUM SERPL-MCNC: 3.7 MMOL/L — SIGNIFICANT CHANGE UP (ref 3.5–5.3)
POTASSIUM SERPL-SCNC: 3.7 MMOL/L — SIGNIFICANT CHANGE UP (ref 3.5–5.3)
RBC # BLD: 3.51 M/UL — LOW (ref 4.4–5.2)
RBC # FLD: 15 % — SIGNIFICANT CHANGE UP (ref 11–15.6)
SODIUM SERPL-SCNC: 137 MMOL/L — SIGNIFICANT CHANGE UP (ref 135–145)
SURGICAL PATHOLOGY STUDY: SIGNIFICANT CHANGE UP
WBC # BLD: 14.1 K/UL — HIGH (ref 4.8–10.8)
WBC # FLD AUTO: 14.1 K/UL — HIGH (ref 4.8–10.8)

## 2019-05-02 RX ORDER — ACETAMINOPHEN 500 MG
500 TABLET ORAL EVERY 4 HOURS
Qty: 0 | Refills: 0 | Status: DISCONTINUED | OUTPATIENT
Start: 2019-05-02 | End: 2019-05-02

## 2019-05-02 RX ORDER — POTASSIUM PHOSPHATE, MONOBASIC POTASSIUM PHOSPHATE, DIBASIC 236; 224 MG/ML; MG/ML
15 INJECTION, SOLUTION INTRAVENOUS ONCE
Qty: 0 | Refills: 0 | Status: COMPLETED | OUTPATIENT
Start: 2019-05-02 | End: 2019-05-02

## 2019-05-02 RX ORDER — MAGNESIUM SULFATE 500 MG/ML
1 VIAL (ML) INJECTION ONCE
Qty: 0 | Refills: 0 | Status: COMPLETED | OUTPATIENT
Start: 2019-05-02 | End: 2019-05-02

## 2019-05-02 RX ORDER — ACETAMINOPHEN 500 MG
500 TABLET ORAL EVERY 4 HOURS
Qty: 0 | Refills: 0 | Status: DISCONTINUED | OUTPATIENT
Start: 2019-05-02 | End: 2019-05-03

## 2019-05-02 RX ORDER — IBUPROFEN 200 MG
200 TABLET ORAL EVERY 4 HOURS
Qty: 0 | Refills: 0 | Status: DISCONTINUED | OUTPATIENT
Start: 2019-05-02 | End: 2019-05-03

## 2019-05-02 RX ORDER — ALBUTEROL 90 UG/1
2 AEROSOL, METERED ORAL EVERY 8 HOURS
Qty: 0 | Refills: 0 | Status: DISCONTINUED | OUTPATIENT
Start: 2019-05-02 | End: 2019-05-03

## 2019-05-02 RX ADMIN — SODIUM CHLORIDE 3 MILLILITER(S): 9 INJECTION INTRAMUSCULAR; INTRAVENOUS; SUBCUTANEOUS at 13:01

## 2019-05-02 RX ADMIN — Medication 500 MILLIGRAM(S): at 15:40

## 2019-05-02 RX ADMIN — Medication 200 MILLIGRAM(S): at 23:41

## 2019-05-02 RX ADMIN — Medication 25 MILLIGRAM(S): at 23:26

## 2019-05-02 RX ADMIN — Medication 500 MILLIGRAM(S): at 15:07

## 2019-05-02 RX ADMIN — SODIUM CHLORIDE 3 MILLILITER(S): 9 INJECTION INTRAMUSCULAR; INTRAVENOUS; SUBCUTANEOUS at 05:52

## 2019-05-02 RX ADMIN — Medication 200 MILLIGRAM(S): at 22:17

## 2019-05-02 RX ADMIN — Medication 200 MILLIGRAM(S): at 17:47

## 2019-05-02 RX ADMIN — Medication 1 TABLET(S): at 05:40

## 2019-05-02 RX ADMIN — ZOLPIDEM TARTRATE 5 MILLIGRAM(S): 10 TABLET ORAL at 22:17

## 2019-05-02 RX ADMIN — Medication 1 TABLET(S): at 17:47

## 2019-05-02 RX ADMIN — ALVIMOPAN 12 MILLIGRAM(S): 12 CAPSULE ORAL at 05:40

## 2019-05-02 RX ADMIN — ALVIMOPAN 12 MILLIGRAM(S): 12 CAPSULE ORAL at 17:50

## 2019-05-02 RX ADMIN — SODIUM CHLORIDE 3 MILLILITER(S): 9 INJECTION INTRAMUSCULAR; INTRAVENOUS; SUBCUTANEOUS at 22:05

## 2019-05-02 RX ADMIN — FENTANYL CITRATE 30 MILLILITER(S): 50 INJECTION INTRAVENOUS at 07:26

## 2019-05-02 RX ADMIN — POTASSIUM PHOSPHATE, MONOBASIC POTASSIUM PHOSPHATE, DIBASIC 62.5 MILLIMOLE(S): 236; 224 INJECTION, SOLUTION INTRAVENOUS at 12:13

## 2019-05-02 RX ADMIN — ATENOLOL 50 MILLIGRAM(S): 25 TABLET ORAL at 05:40

## 2019-05-02 RX ADMIN — Medication 200 MILLIGRAM(S): at 18:20

## 2019-05-02 RX ADMIN — ATENOLOL 50 MILLIGRAM(S): 25 TABLET ORAL at 17:48

## 2019-05-02 RX ADMIN — Medication 200 MILLIGRAM(S): at 15:40

## 2019-05-02 RX ADMIN — Medication 100 GRAM(S): at 10:21

## 2019-05-02 RX ADMIN — Medication 200 MILLIGRAM(S): at 15:07

## 2019-05-02 RX ADMIN — Medication 75 MICROGRAM(S): at 05:40

## 2019-05-02 RX ADMIN — ATORVASTATIN CALCIUM 40 MILLIGRAM(S): 80 TABLET, FILM COATED ORAL at 22:17

## 2019-05-02 RX ADMIN — ENOXAPARIN SODIUM 40 MILLIGRAM(S): 100 INJECTION SUBCUTANEOUS at 12:54

## 2019-05-02 NOTE — PHYSICAL THERAPY INITIAL EVALUATION ADULT - ADDITIONAL COMMENTS
Pt lives in a  ground floor apartment with 0 steps to enter and  0 stairs inside.  Pt owns medical equipment: none  Pt lives with: Daughter who has down's syndrome. Pt reports that her daughter works M-F during the day and is home at night to provide assistance.

## 2019-05-02 NOTE — PHYSICAL THERAPY INITIAL EVALUATION ADULT - PERTINENT HX OF CURRENT PROBLEM, REHAB EVAL
74 y/o female of who states that she was having frequent UTI in the past and her urologist ran some tests which showed she has a colovesicular fistula. Pt is now s/p  Laparoscopic repair of colovesical fistula with sigmoid colectomy

## 2019-05-02 NOTE — PROGRESS NOTE ADULT - REASON FOR ADMISSION
colovesical fistula
colovesical fistula
s/p Laparoscopic repair of colovesical fistula, UHR, bilateral ureteral stents and sigmoid resection

## 2019-05-03 ENCOUNTER — TRANSCRIPTION ENCOUNTER (OUTPATIENT)
Age: 74
End: 2019-05-03

## 2019-05-03 VITALS — DIASTOLIC BLOOD PRESSURE: 71 MMHG | SYSTOLIC BLOOD PRESSURE: 137 MMHG | HEART RATE: 57 BPM | TEMPERATURE: 98 F

## 2019-05-03 LAB
GLUCOSE BLDC GLUCOMTR-MCNC: 110 MG/DL — HIGH (ref 70–99)
HCT VFR BLD CALC: 31.7 % — LOW (ref 37–47)
HGB BLD-MCNC: 10.2 G/DL — LOW (ref 12–16)
MCHC RBC-ENTMCNC: 27.7 PG — SIGNIFICANT CHANGE UP (ref 27–31)
MCHC RBC-ENTMCNC: 32.2 G/DL — SIGNIFICANT CHANGE UP (ref 32–36)
MCV RBC AUTO: 86.1 FL — SIGNIFICANT CHANGE UP (ref 81–99)
PLATELET # BLD AUTO: 285 K/UL — SIGNIFICANT CHANGE UP (ref 150–400)
RBC # BLD: 3.68 M/UL — LOW (ref 4.4–5.2)
RBC # FLD: 14.4 % — SIGNIFICANT CHANGE UP (ref 11–15.6)
WBC # BLD: 12.1 K/UL — HIGH (ref 4.8–10.8)
WBC # FLD AUTO: 12.1 K/UL — HIGH (ref 4.8–10.8)

## 2019-05-03 PROCEDURE — 97161 PT EVAL LOW COMPLEX 20 MIN: CPT

## 2019-05-03 PROCEDURE — 80048 BASIC METABOLIC PNL TOTAL CA: CPT

## 2019-05-03 PROCEDURE — 82962 GLUCOSE BLOOD TEST: CPT

## 2019-05-03 PROCEDURE — 85027 COMPLETE CBC AUTOMATED: CPT

## 2019-05-03 PROCEDURE — C1758: CPT

## 2019-05-03 PROCEDURE — 84100 ASSAY OF PHOSPHORUS: CPT

## 2019-05-03 PROCEDURE — 88307 TISSUE EXAM BY PATHOLOGIST: CPT

## 2019-05-03 PROCEDURE — C1781: CPT

## 2019-05-03 PROCEDURE — 83735 ASSAY OF MAGNESIUM: CPT

## 2019-05-03 PROCEDURE — 88304 TISSUE EXAM BY PATHOLOGIST: CPT

## 2019-05-03 PROCEDURE — 36415 COLL VENOUS BLD VENIPUNCTURE: CPT

## 2019-05-03 PROCEDURE — 88302 TISSUE EXAM BY PATHOLOGIST: CPT

## 2019-05-03 RX ORDER — TRAMADOL HYDROCHLORIDE 50 MG/1
1 TABLET ORAL
Qty: 20 | Refills: 0
Start: 2019-05-03

## 2019-05-03 RX ORDER — TRAMADOL HYDROCHLORIDE 50 MG/1
1 TABLET ORAL
Qty: 0 | Refills: 0 | COMMUNITY

## 2019-05-03 RX ORDER — TRAMADOL HYDROCHLORIDE 50 MG/1
1 TABLET ORAL
Qty: 20 | Refills: 0 | OUTPATIENT
Start: 2019-05-03

## 2019-05-03 RX ORDER — IBUPROFEN 200 MG
1 TABLET ORAL
Qty: 0 | Refills: 0 | DISCHARGE
Start: 2019-05-03

## 2019-05-03 RX ADMIN — ALVIMOPAN 12 MILLIGRAM(S): 12 CAPSULE ORAL at 05:36

## 2019-05-03 RX ADMIN — Medication 200 MILLIGRAM(S): at 02:41

## 2019-05-03 RX ADMIN — Medication 1 TABLET(S): at 05:36

## 2019-05-03 RX ADMIN — Medication 75 MICROGRAM(S): at 05:36

## 2019-05-03 RX ADMIN — ATENOLOL 50 MILLIGRAM(S): 25 TABLET ORAL at 05:36

## 2019-05-03 RX ADMIN — Medication 200 MILLIGRAM(S): at 01:59

## 2019-05-03 RX ADMIN — Medication 25 MILLIGRAM(S): at 06:16

## 2019-05-03 RX ADMIN — Medication 200 MILLIGRAM(S): at 05:36

## 2019-05-03 RX ADMIN — SODIUM CHLORIDE 3 MILLILITER(S): 9 INJECTION INTRAMUSCULAR; INTRAVENOUS; SUBCUTANEOUS at 05:37

## 2019-05-03 NOTE — PROGRESS NOTE ADULT - PROVIDER SPECIALTY LIST ADULT
Anesthesia
Colorectal Surgery
Colorectal Surgery
Family Medicine
Surgery
Urology
Family Medicine

## 2019-05-03 NOTE — DISCHARGE NOTE PROVIDER - CARE PROVIDER_API CALL
James Pineda (MD)  Surgery  [address] [place] [id number]  Phone: [phone]  Fax: [phone]  Follow Up Time:     Pepe Jimenez ()  Surgery  [address] [place] [address]  Phone: [phone]  Fax: [phone]  Follow Up Time: 1 week

## 2019-05-03 NOTE — DISCHARGE NOTE NURSING/CASE MANAGEMENT/SOCIAL WORK - NSDCDPATPORTLINK_GEN_ALL_CORE
You can access the [facility] Patient Portal, offered by [facility], by registering with the following website: [email or web address]

## 2019-05-03 NOTE — PROGRESS NOTE ADULT - SUBJECTIVE AND OBJECTIVE BOX
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 1000 milliLiter(s) (50 mL/Hr) IV Continuous <Continuous>  dextrose 50% Injectable 12.5 Gram(s) IV Push once  dextrose 50% Injectable 25 Gram(s) IV Push once  dextrose 50% Injectable 25 Gram(s) IV Push once  enoxaparin Injectable 40 milliGRAM(s) SubCutaneous daily  ibuprofen  Tablet. 200 milliGRAM(s) Oral every 4 hours  insulin glargine Injectable (LANTUS) 15 Unit(s) SubCutaneous at bedtime  insulin lispro (HumaLOG) corrective regimen sliding scale   SubCutaneous three times a day before meals  lactated ringers. 1000 milliLiter(s) (100 mL/Hr) IV Continuous <Continuous>  lactobacillus acidophilus 1 Tablet(s) Oral two times a day  levothyroxine 75 MICROGram(s) Oral daily  sodium chloride 0.9% lock flush 3 milliLiter(s) IV Push every 8 hours    MEDICATIONS  (PRN):  acetaminophen    Suspension .. 500 milliGRAM(s) Oral every 4 hours PRN Mild Pain (1 - 3)  dextrose 40% Gel 15 Gram(s) Oral once PRN Blood Glucose LESS THAN 70 milliGRAM(s)/deciliter  glucagon  Injectable 1 milliGRAM(s) IntraMuscular once PRN Glucose LESS THAN 70 milligrams/deciliter  hydrALAZINE 25 milliGRAM(s) Oral three times a day PRN SPB > 170  naloxone Injectable 0.1 milliGRAM(s) IV Push every 3 minutes PRN For ANY of the following changes in patient status:  A. RR LESS THAN 10 breaths per minute, B. Oxygen saturation LESS THAN 90%, C. Sedation score of 6  ondansetron Injectable 4 milliGRAM(s) IV Push every 6 hours PRN Nausea  zolpidem 5 milliGRAM(s) Oral at bedtime PRN Insomnia  zolpidem 5 milliGRAM(s) Oral at bedtime PRN Insomnia                           9.8    14.1  )-----------( 266      ( 02 May 2019 04:31 )             30.0     05-02    137  |  97<L>  |  8.0  ----------------------------<  126<H>  3.7   |  30.0<H>  |  0.62    Ca    8.7      02 May 2019 04:31  Phos  2.9     05-02  Mg     1.6     05-02        ;  Vital Signs Last 24 Hrs  T(C): 37.2 (02 May 2019 15:15), Max: 37.3 (02 May 2019 00:27)  T(F): 99 (02 May 2019 15:15), Max: 99.2 (02 May 2019 00:27)  HR: 65 (02 May 2019 15:15) (62 - 73)  BP: 155/54 (02 May 2019 15:15) (150/68 - 165/65)  BP(mean): --  RR: 18 (02 May 2019 15:15) (18 - 18)  SpO2: 95% (02 May 2019 15:15) (90% - 98%)  CAPILLARY BLOOD GLUCOSE      05-01 @ 07:01  -  05-02 @ 07:00  --------------------------------------------------------  IN: 1000 mL / OUT: 3525 mL / NET: -2525 mL    05-02 @ 07:01  -  05-02 @ 19:13  --------------------------------------------------------  IN: 2050 mL / OUT: 0 mL / NET: 2050 mL          Patient feeling better No CP, No SOB, No N/V + Flatus  HEENT: KELBYLA  Neck: Supple  Cardio: S1 S2 No Murmur  Pulm: CTA No Rales or Ronchi  Abd: Soft NT ND BS+ Incision bandage clean  Rectal Plevic Breast- refused  Ext: No DCT  Skin: No Rash  Neuro: Awake Pleasant      Diverticulitis - Post op pain control ambulation  Hypothyroidism - synthroid  Prediabetes - SS acchecks, lantus  Hypertension - atenolol with PRN doses  Asthma Occ Cough- Albuterol ordered
**above medical and urological follow up notes appreciated **    POD # 1 s/p cysto with bilateral ureteral stents, laparoscopic assisted repair colovesical fistulae with sigmoid colectomy    IV: LR @ 100cc/hr    I&O's Summary    30 Apr 2019 07:01  -  01 May 2019 07:00  --------------------------------------------------------  IN: 0 mL / OUT: 375 mL / NET: -375 mL        diet: ice, chips, sips of water    Patient: afebrile VSS awake and alert resting in bed mild post operative discomfort relief with meds.  ( PCA in progress )     T(C): 37.3 (05-01-19 @ 04:41), Max: 37.3 (05-01-19 @ 04:41)  HR: 80 (05-01-19 @ 04:41) (62 - 88)  BP: 134/70 (05-01-19 @ 04:41) (132/53 - 169/67)  RR: 18 (05-01-19 @ 04:41) (8 - 23)  SpO2: 96% (05-01-19 @ 04:41) (93% - 100%)  Wt(kg): --    chest: good air exchange, denies SOB, chest pain or palpatations  Abdomen: soft, mildly distended, provena dressing dry and intact,  mild post operative discomfort.  hypoactive BS, denies flatus or BM at present.   output: mendez catheter in place adequate output, clear and yellow ( KEEP MENDEZ IN PLACE AT PRESENT - ) urology following  Extremities: warm to toes with ouit calf pain or tenderness, OOB to chair as tolerated.         ***AM LABS NOTED***                   10.5   16.7  )-----------( 300      ( 01 May 2019 05:11 )             32.2     05-01    136  |  96<L>  |  12.0  ----------------------------<  149<H>  3.8   |  26.0  |  0.80    Ca    8.2<L>      01 May 2019 05:11  Phos  4.0     05-01  Mg     1.4     05-01       - slightly low- replacements ordered-will follow           xrays:    PAST MEDICAL & SURGICAL HISTORY:  Hypothyroidism  Breast cancer: 2006, surgery, radiation and hormone therapy  Prediabetes  Hypertension  History of lumpectomy: left 2006  H/O laminectomy: lumbar 1992  H/O total hysterectomy: 1987  History of cholecystectomy: 1979          Impression: stable POD # 1 , tolerated surgery well, surgical sites clean and dry, relief with and  pain management in progress.  tolerating sips clear, ice chips, OOB to chair   WBC elevatede slightly consistent with post-op day # 1  surgery   Discuss with Surgeon present situation and continued care.     Plan: continue present care and management, follow bowel function, OOB to chair, continue mendez catheter, PCA pain management.  Continue general care and management with medicine and urology. Magnesium replacemant.  Follow up am labs.
73 year Old Female S/P Cystoscopy, Insertion of Bilateral Ureteral Catheter, Laparoscopic Resection Colovescula Fistulae and Umbilical Hernia Repair under GETA on /30/19. Patient is awake and alert, vital signs are stable. Patient is being well managed. Patient had no complaints or complications with regard to Anesthesia Care.
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 1000 milliLiter(s) (50 mL/Hr) IV Continuous <Continuous>  dextrose 50% Injectable 12.5 Gram(s) IV Push once  dextrose 50% Injectable 25 Gram(s) IV Push once  dextrose 50% Injectable 25 Gram(s) IV Push once  enoxaparin Injectable 40 milliGRAM(s) SubCutaneous daily  fentaNYL PCA (50 MICROgram(s)/mL) 30 milliLiter(s) PCA Continuous PCA Continuous  insulin glargine Injectable (LANTUS) 15 Unit(s) SubCutaneous at bedtime  insulin lispro (HumaLOG) corrective regimen sliding scale   SubCutaneous three times a day before meals  lactated ringers. 1000 milliLiter(s) (100 mL/Hr) IV Continuous <Continuous>  lactobacillus acidophilus 1 Tablet(s) Oral two times a day  levothyroxine 75 MICROGram(s) Oral daily  sodium chloride 0.9% lock flush 3 milliLiter(s) IV Push every 8 hours    MEDICATIONS  (PRN):  dextrose 40% Gel 15 Gram(s) Oral once PRN Blood Glucose LESS THAN 70 milliGRAM(s)/deciliter  glucagon  Injectable 1 milliGRAM(s) IntraMuscular once PRN Glucose LESS THAN 70 milligrams/deciliter  hydrALAZINE 25 milliGRAM(s) Oral three times a day PRN SPB > 170  naloxone Injectable 0.1 milliGRAM(s) IV Push every 3 minutes PRN For ANY of the following changes in patient status:  A. RR LESS THAN 10 breaths per minute, B. Oxygen saturation LESS THAN 90%, C. Sedation score of 6  ondansetron Injectable 4 milliGRAM(s) IV Push every 6 hours PRN Nausea  zolpidem 5 milliGRAM(s) Oral at bedtime PRN Insomnia  zolpidem 5 milliGRAM(s) Oral at bedtime PRN Insomnia                           10.5   16.7  )-----------( 300      ( 01 May 2019 05:11 )             32.2     05-01    136  |  96<L>  |  12.0  ----------------------------<  149<H>  3.8   |  26.0  |  0.80    Ca    8.2<L>      01 May 2019 05:11  Phos  4.0     05-01  Mg     1.4     05-01        ;  Vital Signs Last 24 Hrs  T(C): 37.1 (01 May 2019 16:15), Max: 37.3 (01 May 2019 04:41)  T(F): 98.8 (01 May 2019 16:15), Max: 99.1 (01 May 2019 04:41)  HR: 70 (01 May 2019 16:15) (70 - 88)  BP: 149/54 (01 May 2019 16:15) (134/70 - 163/75)  BP(mean): --  RR: 18 (01 May 2019 16:15) (18 - 18)  SpO2: 96% (01 May 2019 08:52) (95% - 96%)  CAPILLARY BLOOD GLUCOSE      04-30 @ 07:01  -  05-01 @ 07:00  --------------------------------------------------------  IN: 0 mL / OUT: 375 mL / NET: -375 mL    05-01 @ 07:01  -  05-01 @ 19:41  --------------------------------------------------------  IN: 0 mL / OUT: 2125 mL / NET: -2125 mL      Patient feeling better No CP, No SOB, No N/V + Flatus  HEENT: PEARLA  Neck: Supple  Cardio: S1 S2 No Murmur  Pulm: CTA No Rales or Ronchi  Abd: Soft NT ND BS+ Incision bandage clean  Rectal Plevic Breast- refused  Ext: No DCT  Skin: No Rash  Neuro: Awake Pleasant      Diverticulitis - Post op pain control ambulation  Hypothyroidism - synthroid  Prediabetes - SS acchemyriam lantus  Hypertension - atenolol with PRN doses      Spoke with Family
INTERVAL HPI/OVERNIGHT EVENTS:    MEDICATIONS  (STANDING):  alvimopan 12 milliGRAM(s) Oral two times a day  ATENolol  Tablet 50 milliGRAM(s) Oral every 12 hours  atorvastatin 40 milliGRAM(s) Oral at bedtime  cefoTEtan  IVPB 2 Gram(s) IV Intermittent once  dextrose 5%. 1000 milliLiter(s) (50 mL/Hr) IV Continuous <Continuous>  dextrose 50% Injectable 12.5 Gram(s) IV Push once  dextrose 50% Injectable 25 Gram(s) IV Push once  dextrose 50% Injectable 25 Gram(s) IV Push once  enoxaparin Injectable 40 milliGRAM(s) SubCutaneous daily  fentaNYL PCA (50 MICROgram(s)/mL) 30 milliLiter(s) PCA Continuous PCA Continuous  insulin glargine Injectable (LANTUS) 15 Unit(s) SubCutaneous at bedtime  insulin lispro (HumaLOG) corrective regimen sliding scale   SubCutaneous three times a day before meals  lactated ringers. 1000 milliLiter(s) (100 mL/Hr) IV Continuous <Continuous>  lactobacillus acidophilus 1 Tablet(s) Oral two times a day  levothyroxine 75 MICROGram(s) Oral daily  sodium chloride 0.9% lock flush 3 milliLiter(s) IV Push every 8 hours    MEDICATIONS  (PRN):  dextrose 40% Gel 15 Gram(s) Oral once PRN Blood Glucose LESS THAN 70 milliGRAM(s)/deciliter  glucagon  Injectable 1 milliGRAM(s) IntraMuscular once PRN Glucose LESS THAN 70 milligrams/deciliter  hydrALAZINE 25 milliGRAM(s) Oral three times a day PRN SPB > 170  naloxone Injectable 0.1 milliGRAM(s) IV Push every 3 minutes PRN For ANY of the following changes in patient status:  A. RR LESS THAN 10 breaths per minute, B. Oxygen saturation LESS THAN 90%, C. Sedation score of 6  ondansetron Injectable 4 milliGRAM(s) IV Push every 6 hours PRN Nausea  zolpidem 5 milliGRAM(s) Oral at bedtime PRN Insomnia  zolpidem 5 milliGRAM(s) Oral at bedtime PRN Insomnia      Allergies    ACE inhibitors (Angioedema)  morphine (Hives)    Intolerances        Vital Signs Last 24 Hrs  T(C): 37.3 (01 May 2019 04:41), Max: 37.3 (01 May 2019 04:41)  T(F): 99.1 (01 May 2019 04:41), Max: 99.1 (01 May 2019 04:41)  HR: 80 (01 May 2019 04:41) (62 - 88)  BP: 134/70 (01 May 2019 04:41) (132/53 - 169/67)  BP(mean): --  RR: 18 (01 May 2019 04:41) (8 - 23)  SpO2: 96% (01 May 2019 04:41) (93% - 100%)     ON PE:  General: alert and awake  Abdomen: soft post op discomfort  : Szymanski intact urine clearing    LABS:                        10.5   16.7  )-----------( 300      ( 01 May 2019 05:11 )             32.2     05-01    136  |  96<L>  |  12.0  ----------------------------<  149<H>  3.8   |  26.0  |  0.80    Ca    8.2<L>      01 May 2019 05:11  Phos  4.0     05-01  Mg     1.4     05-01            RADIOLOGY & ADDITIONAL TESTS:
Minimal discomfort ,OR D/W patient ,dressing in tact ,Maintain mendez for 10 days as  per urology ,clear liquids ,Mobilize.
No nausea ,passed flatus and had BM .Dressing in tact .WBC decreasing ,,VSS ,Szymanski for 10 days post op .Advance to Full liquid diet .D/C PCA .
Subjective: Patient was seen bedside for a 6 hour POC. She was resting comfortably in bed. She c/o pain but was well controlled with PCA pump. Had an episode of nausea that was relieved with Zofran. No vomiting. She was able to get OOB to chair for approx. 30 minutes with assistance. She is tolerating CLD well. No flatus or BM but she states she feels like she may have the need to go soon. Dressing was c/d/i. No chest pain, SOB, HA, dizziness.       STATUS POST: Laparoscopic repair of colovesical fistula, UHR, bilateral ureteral stents and sigmoid resection    POST OPERATIVE DAY #: 0    MEDICATIONS  (STANDING):  ATENolol  Tablet 100 milliGRAM(s) Oral once  atorvastatin 40 milliGRAM(s) Oral at bedtime  cefoTEtan  IVPB 2 Gram(s) IV Intermittent once  fentaNYL PCA (50 MICROgram(s)/mL) 30 milliLiter(s) PCA Continuous PCA Continuous  lactated ringers. 1000 milliLiter(s) (100 mL/Hr) IV Continuous <Continuous>  levothyroxine 75 MICROGram(s) Oral daily  sodium chloride 0.9% lock flush 3 milliLiter(s) IV Push every 8 hours    MEDICATIONS  (PRN):  naloxone Injectable 0.1 milliGRAM(s) IV Push every 3 minutes PRN For ANY of the following changes in patient status:  A. RR LESS THAN 10 breaths per minute, B. Oxygen saturation LESS THAN 90%, C. Sedation score of 6  ondansetron Injectable 4 milliGRAM(s) IV Push every 6 hours PRN Nausea      Vital Signs Last 24 Hrs  T(C): 36.8 (30 Apr 2019 20:00), Max: 36.8 (30 Apr 2019 08:26)  T(F): 98.2 (30 Apr 2019 20:00), Max: 98.3 (30 Apr 2019 18:15)  HR: 88 (30 Apr 2019 20:00) (62 - 88)  BP: 163/75 (30 Apr 2019 20:00) (132/53 - 169/67)  BP(mean): --  RR: 18 (30 Apr 2019 20:00) (8 - 23)  SpO2: 100% (30 Apr 2019 19:10) (93% - 100%)      04-30 - 04-30  --------------------------------------------------------  IN:  Total IN: 0 mL    OUT:    Indwelling Catheter - Urethral: 175 mL  Total OUT: 175 mL    Total NET: -175 mL          Physical Exam:    Constitutional: NAD  HEENT: PERRL, EOMI  Neck: No JVD, FROM without pain  Respiratory: Respirations non-labored, no accessory muscle use  Cardiovascular: Regular rate & rhythm, S1, S2  Gastrointestinal: Soft, appropriately tender to palpation, no distension, dressing c/d/i.  : Szymanski catheter in place with mild blood tinged urine.   Extremities: No peripheral edema, No cyanosis
VSS,Labs satisfactory ,Home with mendez as per urology ,f/u opd  next week.

## 2019-05-03 NOTE — DISCHARGE NOTE PROVIDER - HOSPITAL COURSE
Pt was admitted on 4/30 and underwent a laparoscopic repair of colovesicular fistula. Ureteral stents were put in place pre-operatively. The case was uncomplicated as was her post-operative course. Her stents have been removed and her diet has been advanced. Patient is stable: tolerating diet, voiding, ambulating, pain well controlled.

## 2019-05-03 NOTE — DISCHARGE NOTE PROVIDER - NSDCCPCAREPLAN_GEN_ALL_CORE_FT
PRINCIPAL DISCHARGE DIAGNOSIS  Diagnosis: Leisenring-vesical fistula  Assessment and Plan of Treatment: Follow up with your surgeon as described. No lifting anything heavier than 10 pounds. Resume usual diet.  Contact a physician or present to the nearest emergency room for fever, chills, or pain not relieved by medications. Do not drive while taking prescription pain medications.

## 2019-08-12 PROBLEM — E03.9 HYPOTHYROIDISM, UNSPECIFIED: Chronic | Status: ACTIVE | Noted: 2019-04-22

## 2019-08-12 PROBLEM — C50.919 MALIGNANT NEOPLASM OF UNSPECIFIED SITE OF UNSPECIFIED FEMALE BREAST: Chronic | Status: ACTIVE | Noted: 2019-04-22

## 2019-08-12 PROBLEM — I10 ESSENTIAL (PRIMARY) HYPERTENSION: Chronic | Status: ACTIVE | Noted: 2019-04-22

## 2019-08-12 PROBLEM — R73.03 PREDIABETES: Chronic | Status: ACTIVE | Noted: 2019-04-22

## 2019-11-14 ENCOUNTER — APPOINTMENT (OUTPATIENT)
Dept: OBGYN | Facility: CLINIC | Age: 74
End: 2019-11-14

## 2020-07-07 NOTE — PHYSICAL THERAPY INITIAL EVALUATION ADULT - IMPAIRMENTS CONTRIBUTING TO GAIT DEVIATIONS, PT EVAL
Alert and oriented to person, place, time/situation. normal mood and affect. no apparent risk to self or others.
pain

## 2020-11-02 ENCOUNTER — APPOINTMENT (OUTPATIENT)
Dept: OBGYN | Facility: CLINIC | Age: 75
End: 2020-11-02
Payer: MEDICARE

## 2020-11-02 VITALS
DIASTOLIC BLOOD PRESSURE: 80 MMHG | WEIGHT: 150 LBS | SYSTOLIC BLOOD PRESSURE: 140 MMHG | HEIGHT: 64 IN | BODY MASS INDEX: 25.61 KG/M2 | TEMPERATURE: 98.8 F

## 2020-11-02 DIAGNOSIS — Z78.0 ASYMPTOMATIC MENOPAUSAL STATE: ICD-10-CM

## 2020-11-02 PROCEDURE — 82270 OCCULT BLOOD FECES: CPT

## 2020-11-02 PROCEDURE — G0101: CPT

## 2020-11-02 NOTE — PHYSICAL EXAM
[Appropriately responsive] : appropriately responsive [Alert] : alert [No Acute Distress] : no acute distress [No Lymphadenopathy] : no lymphadenopathy [Regular Rate Rhythm] : regular rate rhythm [No Murmurs] : no murmurs [Clear to Auscultation B/L] : clear to auscultation bilaterally [Soft] : soft [Non-tender] : non-tender [Non-distended] : non-distended [No HSM] : No HSM [No Lesions] : no lesions [No Mass] : no mass [Oriented x3] : oriented x3 [FreeTextEntry6] : SKIN CHANGES FROM RT ON THE LEFT [Examination Of The Breasts] : a normal appearance [No Masses] : no breast masses were palpable [Labia Majora] : normal [Labia Minora] : normal [Absent] : absent [Uterine Adnexae] : normal [Normal rectal exam] : was normal [Normal Brown Stool] : was normal and brown [Occult Blood Positive] : was negative for occult blood analysis [Internal Hemorrhoid] : no internal hemorrhoids were present [External Hemorrhoid] : no external hemorrhoids were present [Skin Tags] : no residual hemorrhoidal skin tags [Normal] : was normal [None] : there was no rectal mass

## 2020-11-02 NOTE — PLAN
[FreeTextEntry1] : 76 YO FEMALE PRESENTS FOR ANNUAL GYN EXAMINATION. SELF BREAST EXAMINATION TAUGHT. MAMMOGRAM ORDERED. EXERCISE, CALCIUM AND VITAMIN D3 SUPPLEMENTATION DISCUSSED. BONE DENSITY STUDY AND INDICATIONS REVIEWED. COLONOSCOPY HISTORY REVIEWED AND DISCUSSED FOLLOWUP.

## 2020-11-02 NOTE — COUNSELING
[Nutrition/ Exercise/ Weight Management] : nutrition, exercise, weight management [Body Image] : body image [Vitamins/Supplements] : vitamins/supplements [Breast Self Exam] : breast self exam [Pre/Post Op Instructions] : pre/post op instructions

## 2021-08-16 NOTE — H&P PST ADULT - PULMONARY EMBOLUS
patient improving on cefepime  stopped vancomycin and flagyl   no vascular surgery interventions planned   Can d/c on PO Levaquin 750mg daily to complete 10 days total including the days with cefepime  will sign off. Please call with questions    D/W Dr. Burnett and Dr. Lindsay Barnes DO  Infectious Disease Attending  Pager [phone]  After 5pm/weekends please call [phone] for all inquiries and new consults
no

## 2021-12-27 ENCOUNTER — APPOINTMENT (OUTPATIENT)
Dept: OBGYN | Facility: CLINIC | Age: 76
End: 2021-12-27

## 2022-01-08 ENCOUNTER — APPOINTMENT (OUTPATIENT)
Dept: OBGYN | Facility: CLINIC | Age: 77
End: 2022-01-08

## 2022-03-12 ENCOUNTER — APPOINTMENT (OUTPATIENT)
Dept: OBGYN | Facility: CLINIC | Age: 77
End: 2022-03-12
Payer: MEDICARE

## 2022-03-12 VITALS
SYSTOLIC BLOOD PRESSURE: 120 MMHG | HEIGHT: 64 IN | BODY MASS INDEX: 25.27 KG/M2 | DIASTOLIC BLOOD PRESSURE: 80 MMHG | WEIGHT: 148 LBS

## 2022-03-12 DIAGNOSIS — Z01.419 ENCOUNTER FOR GYNECOLOGICAL EXAMINATION (GENERAL) (ROUTINE) W/OUT ABNORMAL FINDINGS: ICD-10-CM

## 2022-03-12 PROCEDURE — G0101: CPT

## 2022-03-12 NOTE — PHYSICAL EXAM
[Appropriately responsive] : appropriately responsive [Alert] : alert [No Acute Distress] : no acute distress [No Lymphadenopathy] : no lymphadenopathy [Regular Rate Rhythm] : regular rate rhythm [No Murmurs] : no murmurs [Clear to Auscultation B/L] : clear to auscultation bilaterally [Soft] : soft [Non-tender] : non-tender [Non-distended] : non-distended [No HSM] : No HSM [No Lesions] : no lesions [No Mass] : no mass [Oriented x3] : oriented x3 [FreeTextEntry6] : SKIN CHANGES FROM RT ON THE LEFT [Examination Of The Breasts] : a normal appearance [No Masses] : no breast masses were palpable [Labia Majora] : normal [Labia Minora] : normal [Absent] : absent [Normal rectal exam] : was normal [Uterine Adnexae] : normal [Normal Brown Stool] : was normal and brown [Occult Blood Positive] : was negative for occult blood analysis [Internal Hemorrhoid] : no internal hemorrhoids were present [External Hemorrhoid] : no external hemorrhoids were present [Skin Tags] : no residual hemorrhoidal skin tags [Normal] : was normal [None] : there was no rectal mass

## 2022-03-12 NOTE — PLAN
[FreeTextEntry1] :  75 YO FEMALE PRESENTS FOR ANNUAL GYN EXAMINATION. SELF BREAST EXAMINATION TAUGHT. MAMMOGRAM ORDERED. EXERCISE, CALCIUM AND VITAMIN D3 SUPPLEMENTATION DISCUSSED. BONE DENSITY STUDY AND INDICATIONS REVIEWED. COLONOSCOPY HISTORY REVIEWED AND DISCUSSED FOLLOWUP

## 2022-03-12 NOTE — HISTORY OF PRESENT ILLNESS
[FreeTextEntry1] : PT PRESENTS FOR ANNUAL VISIT. DENIES COMPLAINTS. GOES FOR MAMMO AT [facility]

## 2022-03-26 ENCOUNTER — OUTPATIENT (OUTPATIENT)
Dept: OUTPATIENT SERVICES | Facility: HOSPITAL | Age: 77
LOS: 1 days | End: 2022-03-26
Payer: MEDICARE

## 2022-03-26 ENCOUNTER — APPOINTMENT (OUTPATIENT)
Dept: RADIOLOGY | Facility: CLINIC | Age: 77
End: 2022-03-26
Payer: MEDICARE

## 2022-03-26 DIAGNOSIS — Z98.890 OTHER SPECIFIED POSTPROCEDURAL STATES: Chronic | ICD-10-CM

## 2022-03-26 DIAGNOSIS — Z90.710 ACQUIRED ABSENCE OF BOTH CERVIX AND UTERUS: Chronic | ICD-10-CM

## 2022-03-26 DIAGNOSIS — Z90.49 ACQUIRED ABSENCE OF OTHER SPECIFIED PARTS OF DIGESTIVE TRACT: Chronic | ICD-10-CM

## 2022-03-26 DIAGNOSIS — Z01.419 ENCOUNTER FOR GYNECOLOGICAL EXAMINATION (GENERAL) (ROUTINE) WITHOUT ABNORMAL FINDINGS: ICD-10-CM

## 2022-03-26 PROCEDURE — 77080 DXA BONE DENSITY AXIAL: CPT | Mod: 26

## 2022-03-26 PROCEDURE — 77080 DXA BONE DENSITY AXIAL: CPT

## 2022-03-29 ENCOUNTER — NON-APPOINTMENT (OUTPATIENT)
Age: 77
End: 2022-03-29

## 2022-04-30 ENCOUNTER — APPOINTMENT (OUTPATIENT)
Dept: OBGYN | Facility: CLINIC | Age: 77
End: 2022-04-30
Payer: MEDICARE

## 2022-04-30 VITALS
HEIGHT: 64 IN | BODY MASS INDEX: 25.27 KG/M2 | DIASTOLIC BLOOD PRESSURE: 80 MMHG | SYSTOLIC BLOOD PRESSURE: 140 MMHG | WEIGHT: 148 LBS

## 2022-04-30 DIAGNOSIS — Z85.3 PERSONAL HISTORY OF MALIGNANT NEOPLASM OF BREAST: ICD-10-CM

## 2022-04-30 DIAGNOSIS — N64.4 MASTODYNIA: ICD-10-CM

## 2022-04-30 PROCEDURE — 99213 OFFICE O/P EST LOW 20 MIN: CPT

## 2022-04-30 NOTE — PHYSICAL EXAM
[Lt > Rt] : the left breast was larger than the right [Normal] : normal [No Discharge] : no discharge [The Right Breast Was Examined] : a normal appearance [No Masses] : no breast masses were palpable [Axillary Lymph Nodes Enlarged Bilaterally] : no enlarged nodes

## 2022-04-30 NOTE — CHIEF COMPLAINT
[Urgent Visit] : Urgent Visit [FreeTextEntry1] : PT WITH PAIN IN THE LEFT BREAST AND ITCHING. HAS RT CHANGES THERE

## 2022-05-03 ENCOUNTER — APPOINTMENT (OUTPATIENT)
Dept: MRI IMAGING | Facility: CLINIC | Age: 77
End: 2022-05-03
Payer: MEDICARE

## 2022-05-03 ENCOUNTER — OUTPATIENT (OUTPATIENT)
Dept: OUTPATIENT SERVICES | Facility: HOSPITAL | Age: 77
LOS: 1 days | End: 2022-05-03
Payer: MEDICARE

## 2022-05-03 DIAGNOSIS — Z90.710 ACQUIRED ABSENCE OF BOTH CERVIX AND UTERUS: Chronic | ICD-10-CM

## 2022-05-03 DIAGNOSIS — Z98.890 OTHER SPECIFIED POSTPROCEDURAL STATES: Chronic | ICD-10-CM

## 2022-05-03 DIAGNOSIS — Z85.3 PERSONAL HISTORY OF MALIGNANT NEOPLASM OF BREAST: ICD-10-CM

## 2022-05-03 DIAGNOSIS — Z90.49 ACQUIRED ABSENCE OF OTHER SPECIFIED PARTS OF DIGESTIVE TRACT: Chronic | ICD-10-CM

## 2022-05-03 PROCEDURE — G1004: CPT

## 2022-05-03 PROCEDURE — 77049 MRI BREAST C-+ W/CAD BI: CPT | Mod: 26,ME

## 2022-05-03 PROCEDURE — A9585: CPT

## 2022-05-03 PROCEDURE — C8937: CPT

## 2022-05-03 PROCEDURE — C8908: CPT | Mod: ME

## 2022-05-06 ENCOUNTER — NON-APPOINTMENT (OUTPATIENT)
Age: 77
End: 2022-05-06

## 2023-10-06 ENCOUNTER — APPOINTMENT (OUTPATIENT)
Dept: CARDIOTHORACIC SURGERY | Facility: CLINIC | Age: 78
End: 2023-10-06
Payer: MEDICARE

## 2023-10-06 VITALS
TEMPERATURE: 98 F | HEIGHT: 60 IN | HEART RATE: 66 BPM | SYSTOLIC BLOOD PRESSURE: 179 MMHG | DIASTOLIC BLOOD PRESSURE: 73 MMHG | WEIGHT: 132 LBS | OXYGEN SATURATION: 98 % | BODY MASS INDEX: 25.91 KG/M2 | RESPIRATION RATE: 16 BRPM

## 2023-10-06 DIAGNOSIS — I26.99 OTHER PULMONARY EMBOLISM W/OUT ACUTE COR PULMONALE: ICD-10-CM

## 2023-10-06 DIAGNOSIS — Z87.39 PERSONAL HISTORY OF OTHER DISEASES OF THE MUSCULOSKELETAL SYSTEM AND CONNECTIVE TISSUE: ICD-10-CM

## 2023-10-06 DIAGNOSIS — I82.401 ACUTE EMBOLISM AND THROMBOSIS OF UNSPECIFIED DEEP VEINS OF RIGHT LOWER EXTREMITY: ICD-10-CM

## 2023-10-06 DIAGNOSIS — I25.10 ATHEROSCLEROTIC HEART DISEASE OF NATIVE CORONARY ARTERY W/OUT ANGINA PECTORIS: ICD-10-CM

## 2023-10-06 PROCEDURE — 99204 OFFICE O/P NEW MOD 45 MIN: CPT

## 2023-10-06 RX ORDER — LEVOTHYROXINE SODIUM 0.07 MG/1
75 TABLET ORAL
Refills: 0 | Status: ACTIVE | COMMUNITY

## 2023-10-06 RX ORDER — APIXABAN 2.5 MG/1
2.5 TABLET, FILM COATED ORAL
Refills: 0 | Status: ACTIVE | COMMUNITY

## 2023-10-06 RX ORDER — ASPIRIN ENTERIC COATED TABLETS 81 MG 81 MG/1
81 TABLET, DELAYED RELEASE ORAL
Refills: 0 | Status: ACTIVE | COMMUNITY

## 2023-10-06 RX ORDER — OMEPRAZOLE 20 MG/1
20 TABLET, DELAYED RELEASE ORAL
Refills: 0 | Status: ACTIVE | COMMUNITY

## 2023-10-18 ENCOUNTER — APPOINTMENT (OUTPATIENT)
Dept: CT IMAGING | Facility: CLINIC | Age: 78
End: 2023-10-18
Payer: MEDICARE

## 2023-10-18 ENCOUNTER — OUTPATIENT (OUTPATIENT)
Dept: OUTPATIENT SERVICES | Facility: HOSPITAL | Age: 78
LOS: 1 days | End: 2023-10-18
Payer: MEDICARE

## 2023-10-18 ENCOUNTER — RESULT REVIEW (OUTPATIENT)
Age: 78
End: 2023-10-18

## 2023-10-18 DIAGNOSIS — Z90.710 ACQUIRED ABSENCE OF BOTH CERVIX AND UTERUS: Chronic | ICD-10-CM

## 2023-10-18 DIAGNOSIS — Z98.890 OTHER SPECIFIED POSTPROCEDURAL STATES: Chronic | ICD-10-CM

## 2023-10-18 DIAGNOSIS — Z90.49 ACQUIRED ABSENCE OF OTHER SPECIFIED PARTS OF DIGESTIVE TRACT: Chronic | ICD-10-CM

## 2023-10-18 DIAGNOSIS — I35.0 NONRHEUMATIC AORTIC (VALVE) STENOSIS: ICD-10-CM

## 2023-10-18 DIAGNOSIS — I25.10 ATHEROSCLEROTIC HEART DISEASE OF NATIVE CORONARY ARTERY WITHOUT ANGINA PECTORIS: ICD-10-CM

## 2023-10-18 PROCEDURE — 74174 CTA ABD&PLVS W/CONTRAST: CPT | Mod: 26,MH

## 2023-10-18 PROCEDURE — 71275 CT ANGIOGRAPHY CHEST: CPT | Mod: MH

## 2023-10-18 PROCEDURE — 75574 CT ANGIO HRT W/3D IMAGE: CPT | Mod: 26,MH

## 2023-10-18 PROCEDURE — 71275 CT ANGIOGRAPHY CHEST: CPT | Mod: 26,MH

## 2023-10-18 PROCEDURE — 74174 CTA ABD&PLVS W/CONTRAST: CPT

## 2023-10-18 PROCEDURE — 75574 CT ANGIO HRT W/3D IMAGE: CPT

## 2023-10-27 ENCOUNTER — OUTPATIENT (OUTPATIENT)
Dept: OUTPATIENT SERVICES | Facility: HOSPITAL | Age: 78
LOS: 1 days | End: 2023-10-27
Payer: MEDICARE

## 2023-10-27 DIAGNOSIS — Z90.49 ACQUIRED ABSENCE OF OTHER SPECIFIED PARTS OF DIGESTIVE TRACT: Chronic | ICD-10-CM

## 2023-10-27 DIAGNOSIS — I25.10 ATHEROSCLEROTIC HEART DISEASE OF NATIVE CORONARY ARTERY WITHOUT ANGINA PECTORIS: ICD-10-CM

## 2023-10-27 DIAGNOSIS — Z90.710 ACQUIRED ABSENCE OF BOTH CERVIX AND UTERUS: Chronic | ICD-10-CM

## 2023-10-27 DIAGNOSIS — Z98.890 OTHER SPECIFIED POSTPROCEDURAL STATES: Chronic | ICD-10-CM

## 2023-10-27 PROCEDURE — 93306 TTE W/DOPPLER COMPLETE: CPT | Mod: 26

## 2023-10-27 PROCEDURE — C8929: CPT

## 2023-10-27 PROCEDURE — 93306 TTE W/DOPPLER COMPLETE: CPT

## 2023-11-10 ENCOUNTER — OUTPATIENT (OUTPATIENT)
Dept: OUTPATIENT SERVICES | Facility: HOSPITAL | Age: 78
LOS: 1 days | End: 2023-11-10
Payer: MEDICARE

## 2023-11-10 VITALS
HEIGHT: 60 IN | SYSTOLIC BLOOD PRESSURE: 120 MMHG | DIASTOLIC BLOOD PRESSURE: 70 MMHG | TEMPERATURE: 97 F | HEART RATE: 68 BPM | RESPIRATION RATE: 20 BRPM | WEIGHT: 134.7 LBS | OXYGEN SATURATION: 96 %

## 2023-11-10 DIAGNOSIS — Z90.89 ACQUIRED ABSENCE OF OTHER ORGANS: Chronic | ICD-10-CM

## 2023-11-10 DIAGNOSIS — I10 ESSENTIAL (PRIMARY) HYPERTENSION: ICD-10-CM

## 2023-11-10 DIAGNOSIS — Z98.890 OTHER SPECIFIED POSTPROCEDURAL STATES: Chronic | ICD-10-CM

## 2023-11-10 DIAGNOSIS — T14.8XXA OTHER INJURY OF UNSPECIFIED BODY REGION, INITIAL ENCOUNTER: ICD-10-CM

## 2023-11-10 DIAGNOSIS — Z90.710 ACQUIRED ABSENCE OF BOTH CERVIX AND UTERUS: Chronic | ICD-10-CM

## 2023-11-10 DIAGNOSIS — I35.0 NONRHEUMATIC AORTIC (VALVE) STENOSIS: ICD-10-CM

## 2023-11-10 DIAGNOSIS — R73.03 PREDIABETES: ICD-10-CM

## 2023-11-10 DIAGNOSIS — I82.409 ACUTE EMBOLISM AND THROMBOSIS OF UNSPECIFIED DEEP VEINS OF UNSPECIFIED LOWER EXTREMITY: ICD-10-CM

## 2023-11-10 DIAGNOSIS — I25.10 ATHEROSCLEROTIC HEART DISEASE OF NATIVE CORONARY ARTERY WITHOUT ANGINA PECTORIS: ICD-10-CM

## 2023-11-10 DIAGNOSIS — E03.9 HYPOTHYROIDISM, UNSPECIFIED: ICD-10-CM

## 2023-11-10 DIAGNOSIS — Z01.818 ENCOUNTER FOR OTHER PREPROCEDURAL EXAMINATION: ICD-10-CM

## 2023-11-10 DIAGNOSIS — C50.919 MALIGNANT NEOPLASM OF UNSPECIFIED SITE OF UNSPECIFIED FEMALE BREAST: ICD-10-CM

## 2023-11-10 DIAGNOSIS — Z29.9 ENCOUNTER FOR PROPHYLACTIC MEASURES, UNSPECIFIED: ICD-10-CM

## 2023-11-10 DIAGNOSIS — I26.99 OTHER PULMONARY EMBOLISM WITHOUT ACUTE COR PULMONALE: ICD-10-CM

## 2023-11-10 DIAGNOSIS — E78.5 HYPERLIPIDEMIA, UNSPECIFIED: ICD-10-CM

## 2023-11-10 DIAGNOSIS — Z90.49 ACQUIRED ABSENCE OF OTHER SPECIFIED PARTS OF DIGESTIVE TRACT: Chronic | ICD-10-CM

## 2023-11-10 LAB
A1C WITH ESTIMATED AVERAGE GLUCOSE RESULT: 5.5 % — SIGNIFICANT CHANGE UP (ref 4–5.6)
A1C WITH ESTIMATED AVERAGE GLUCOSE RESULT: 5.5 % — SIGNIFICANT CHANGE UP (ref 4–5.6)
ALBUMIN SERPL ELPH-MCNC: 3.8 G/DL — SIGNIFICANT CHANGE UP (ref 3.3–5.2)
ALBUMIN SERPL ELPH-MCNC: 3.8 G/DL — SIGNIFICANT CHANGE UP (ref 3.3–5.2)
ALP SERPL-CCNC: 101 U/L — SIGNIFICANT CHANGE UP (ref 40–120)
ALP SERPL-CCNC: 101 U/L — SIGNIFICANT CHANGE UP (ref 40–120)
ALT FLD-CCNC: 8 U/L — SIGNIFICANT CHANGE UP
ALT FLD-CCNC: 8 U/L — SIGNIFICANT CHANGE UP
ANION GAP SERPL CALC-SCNC: 10 MMOL/L — SIGNIFICANT CHANGE UP (ref 5–17)
ANION GAP SERPL CALC-SCNC: 10 MMOL/L — SIGNIFICANT CHANGE UP (ref 5–17)
APPEARANCE UR: CLEAR — SIGNIFICANT CHANGE UP
APPEARANCE UR: CLEAR — SIGNIFICANT CHANGE UP
APTT BLD: 38.9 SEC — HIGH (ref 24.5–35.6)
APTT BLD: 38.9 SEC — HIGH (ref 24.5–35.6)
AST SERPL-CCNC: 16 U/L — SIGNIFICANT CHANGE UP
AST SERPL-CCNC: 16 U/L — SIGNIFICANT CHANGE UP
BACTERIA # UR AUTO: NEGATIVE /HPF — SIGNIFICANT CHANGE UP
BACTERIA # UR AUTO: NEGATIVE /HPF — SIGNIFICANT CHANGE UP
BASOPHILS # BLD AUTO: 0.09 K/UL — SIGNIFICANT CHANGE UP (ref 0–0.2)
BASOPHILS # BLD AUTO: 0.09 K/UL — SIGNIFICANT CHANGE UP (ref 0–0.2)
BASOPHILS NFR BLD AUTO: 0.9 % — SIGNIFICANT CHANGE UP (ref 0–2)
BASOPHILS NFR BLD AUTO: 0.9 % — SIGNIFICANT CHANGE UP (ref 0–2)
BILIRUB SERPL-MCNC: 0.2 MG/DL — LOW (ref 0.4–2)
BILIRUB SERPL-MCNC: 0.2 MG/DL — LOW (ref 0.4–2)
BILIRUB UR-MCNC: NEGATIVE — SIGNIFICANT CHANGE UP
BILIRUB UR-MCNC: NEGATIVE — SIGNIFICANT CHANGE UP
BLD GP AB SCN SERPL QL: SIGNIFICANT CHANGE UP
BLD GP AB SCN SERPL QL: SIGNIFICANT CHANGE UP
BUN SERPL-MCNC: 27.3 MG/DL — HIGH (ref 8–20)
BUN SERPL-MCNC: 27.3 MG/DL — HIGH (ref 8–20)
CALCIUM SERPL-MCNC: 9.5 MG/DL — SIGNIFICANT CHANGE UP (ref 8.4–10.5)
CALCIUM SERPL-MCNC: 9.5 MG/DL — SIGNIFICANT CHANGE UP (ref 8.4–10.5)
CAST: 19 /LPF — HIGH (ref 0–4)
CAST: 19 /LPF — HIGH (ref 0–4)
CHLORIDE SERPL-SCNC: 97 MMOL/L — SIGNIFICANT CHANGE UP (ref 96–108)
CHLORIDE SERPL-SCNC: 97 MMOL/L — SIGNIFICANT CHANGE UP (ref 96–108)
CO2 SERPL-SCNC: 30 MMOL/L — HIGH (ref 22–29)
CO2 SERPL-SCNC: 30 MMOL/L — HIGH (ref 22–29)
COLOR SPEC: YELLOW — SIGNIFICANT CHANGE UP
COLOR SPEC: YELLOW — SIGNIFICANT CHANGE UP
CREAT SERPL-MCNC: 1.87 MG/DL — HIGH (ref 0.5–1.3)
CREAT SERPL-MCNC: 1.87 MG/DL — HIGH (ref 0.5–1.3)
DIFF PNL FLD: NEGATIVE — SIGNIFICANT CHANGE UP
DIFF PNL FLD: NEGATIVE — SIGNIFICANT CHANGE UP
EGFR: 27 ML/MIN/1.73M2 — LOW
EGFR: 27 ML/MIN/1.73M2 — LOW
EOSINOPHIL # BLD AUTO: 0.47 K/UL — SIGNIFICANT CHANGE UP (ref 0–0.5)
EOSINOPHIL # BLD AUTO: 0.47 K/UL — SIGNIFICANT CHANGE UP (ref 0–0.5)
EOSINOPHIL NFR BLD AUTO: 4.7 % — SIGNIFICANT CHANGE UP (ref 0–6)
EOSINOPHIL NFR BLD AUTO: 4.7 % — SIGNIFICANT CHANGE UP (ref 0–6)
ESTIMATED AVERAGE GLUCOSE: 111 MG/DL — SIGNIFICANT CHANGE UP (ref 68–114)
ESTIMATED AVERAGE GLUCOSE: 111 MG/DL — SIGNIFICANT CHANGE UP (ref 68–114)
GLUCOSE SERPL-MCNC: 100 MG/DL — HIGH (ref 70–99)
GLUCOSE SERPL-MCNC: 100 MG/DL — HIGH (ref 70–99)
GLUCOSE UR QL: NEGATIVE MG/DL — SIGNIFICANT CHANGE UP
GLUCOSE UR QL: NEGATIVE MG/DL — SIGNIFICANT CHANGE UP
HCT VFR BLD CALC: 36.1 % — SIGNIFICANT CHANGE UP (ref 34.5–45)
HCT VFR BLD CALC: 36.1 % — SIGNIFICANT CHANGE UP (ref 34.5–45)
HGB BLD-MCNC: 11.5 G/DL — SIGNIFICANT CHANGE UP (ref 11.5–15.5)
HGB BLD-MCNC: 11.5 G/DL — SIGNIFICANT CHANGE UP (ref 11.5–15.5)
IMM GRANULOCYTES NFR BLD AUTO: 0.7 % — SIGNIFICANT CHANGE UP (ref 0–0.9)
IMM GRANULOCYTES NFR BLD AUTO: 0.7 % — SIGNIFICANT CHANGE UP (ref 0–0.9)
INR BLD: 1.06 RATIO — SIGNIFICANT CHANGE UP (ref 0.85–1.18)
INR BLD: 1.06 RATIO — SIGNIFICANT CHANGE UP (ref 0.85–1.18)
KETONES UR-MCNC: ABNORMAL MG/DL
KETONES UR-MCNC: ABNORMAL MG/DL
LEUKOCYTE ESTERASE UR-ACNC: ABNORMAL
LEUKOCYTE ESTERASE UR-ACNC: ABNORMAL
LYMPHOCYTES # BLD AUTO: 1.12 K/UL — SIGNIFICANT CHANGE UP (ref 1–3.3)
LYMPHOCYTES # BLD AUTO: 1.12 K/UL — SIGNIFICANT CHANGE UP (ref 1–3.3)
LYMPHOCYTES # BLD AUTO: 11.1 % — LOW (ref 13–44)
LYMPHOCYTES # BLD AUTO: 11.1 % — LOW (ref 13–44)
MCHC RBC-ENTMCNC: 28.4 PG — SIGNIFICANT CHANGE UP (ref 27–34)
MCHC RBC-ENTMCNC: 28.4 PG — SIGNIFICANT CHANGE UP (ref 27–34)
MCHC RBC-ENTMCNC: 31.9 GM/DL — LOW (ref 32–36)
MCHC RBC-ENTMCNC: 31.9 GM/DL — LOW (ref 32–36)
MCV RBC AUTO: 89.1 FL — SIGNIFICANT CHANGE UP (ref 80–100)
MCV RBC AUTO: 89.1 FL — SIGNIFICANT CHANGE UP (ref 80–100)
MONOCYTES # BLD AUTO: 0.79 K/UL — SIGNIFICANT CHANGE UP (ref 0–0.9)
MONOCYTES # BLD AUTO: 0.79 K/UL — SIGNIFICANT CHANGE UP (ref 0–0.9)
MONOCYTES NFR BLD AUTO: 7.8 % — SIGNIFICANT CHANGE UP (ref 2–14)
MONOCYTES NFR BLD AUTO: 7.8 % — SIGNIFICANT CHANGE UP (ref 2–14)
MRSA PCR RESULT.: DETECTED
MRSA PCR RESULT.: DETECTED
NEUTROPHILS # BLD AUTO: 7.56 K/UL — HIGH (ref 1.8–7.4)
NEUTROPHILS # BLD AUTO: 7.56 K/UL — HIGH (ref 1.8–7.4)
NEUTROPHILS NFR BLD AUTO: 74.8 % — SIGNIFICANT CHANGE UP (ref 43–77)
NEUTROPHILS NFR BLD AUTO: 74.8 % — SIGNIFICANT CHANGE UP (ref 43–77)
NITRITE UR-MCNC: NEGATIVE — SIGNIFICANT CHANGE UP
NITRITE UR-MCNC: NEGATIVE — SIGNIFICANT CHANGE UP
NT-PROBNP SERPL-SCNC: 4714 PG/ML — HIGH (ref 0–300)
NT-PROBNP SERPL-SCNC: 4714 PG/ML — HIGH (ref 0–300)
PH UR: 6 — SIGNIFICANT CHANGE UP (ref 5–8)
PH UR: 6 — SIGNIFICANT CHANGE UP (ref 5–8)
PLATELET # BLD AUTO: 416 K/UL — HIGH (ref 150–400)
PLATELET # BLD AUTO: 416 K/UL — HIGH (ref 150–400)
POTASSIUM SERPL-MCNC: 5.9 MMOL/L — HIGH (ref 3.5–5.3)
POTASSIUM SERPL-MCNC: 5.9 MMOL/L — HIGH (ref 3.5–5.3)
POTASSIUM SERPL-SCNC: 5.9 MMOL/L — HIGH (ref 3.5–5.3)
POTASSIUM SERPL-SCNC: 5.9 MMOL/L — HIGH (ref 3.5–5.3)
PREALB SERPL-MCNC: 18 MG/DL — SIGNIFICANT CHANGE UP (ref 18–38)
PREALB SERPL-MCNC: 18 MG/DL — SIGNIFICANT CHANGE UP (ref 18–38)
PROT SERPL-MCNC: 7.8 G/DL — SIGNIFICANT CHANGE UP (ref 6.6–8.7)
PROT SERPL-MCNC: 7.8 G/DL — SIGNIFICANT CHANGE UP (ref 6.6–8.7)
PROT UR-MCNC: 300 MG/DL
PROT UR-MCNC: 300 MG/DL
PROTHROM AB SERPL-ACNC: 11.7 SEC — SIGNIFICANT CHANGE UP (ref 9.5–13)
PROTHROM AB SERPL-ACNC: 11.7 SEC — SIGNIFICANT CHANGE UP (ref 9.5–13)
RBC # BLD: 4.05 M/UL — SIGNIFICANT CHANGE UP (ref 3.8–5.2)
RBC # BLD: 4.05 M/UL — SIGNIFICANT CHANGE UP (ref 3.8–5.2)
RBC # FLD: 13.7 % — SIGNIFICANT CHANGE UP (ref 10.3–14.5)
RBC # FLD: 13.7 % — SIGNIFICANT CHANGE UP (ref 10.3–14.5)
RBC CASTS # UR COMP ASSIST: 1 /HPF — SIGNIFICANT CHANGE UP (ref 0–4)
RBC CASTS # UR COMP ASSIST: 1 /HPF — SIGNIFICANT CHANGE UP (ref 0–4)
S AUREUS DNA NOSE QL NAA+PROBE: DETECTED
S AUREUS DNA NOSE QL NAA+PROBE: DETECTED
SODIUM SERPL-SCNC: 137 MMOL/L — SIGNIFICANT CHANGE UP (ref 135–145)
SODIUM SERPL-SCNC: 137 MMOL/L — SIGNIFICANT CHANGE UP (ref 135–145)
SP GR SPEC: 1.02 — SIGNIFICANT CHANGE UP (ref 1–1.03)
SP GR SPEC: 1.02 — SIGNIFICANT CHANGE UP (ref 1–1.03)
SQUAMOUS # UR AUTO: 3 /HPF — SIGNIFICANT CHANGE UP (ref 0–5)
SQUAMOUS # UR AUTO: 3 /HPF — SIGNIFICANT CHANGE UP (ref 0–5)
T3 SERPL-MCNC: 101 NG/DL — SIGNIFICANT CHANGE UP (ref 80–200)
T3 SERPL-MCNC: 101 NG/DL — SIGNIFICANT CHANGE UP (ref 80–200)
T4 AB SER-ACNC: 9.9 UG/DL — SIGNIFICANT CHANGE UP (ref 4.5–12)
T4 AB SER-ACNC: 9.9 UG/DL — SIGNIFICANT CHANGE UP (ref 4.5–12)
TSH SERPL-MCNC: 1.95 UIU/ML — SIGNIFICANT CHANGE UP (ref 0.27–4.2)
TSH SERPL-MCNC: 1.95 UIU/ML — SIGNIFICANT CHANGE UP (ref 0.27–4.2)
UROBILINOGEN FLD QL: 1 MG/DL — SIGNIFICANT CHANGE UP (ref 0.2–1)
UROBILINOGEN FLD QL: 1 MG/DL — SIGNIFICANT CHANGE UP (ref 0.2–1)
WBC # BLD: 10.1 K/UL — SIGNIFICANT CHANGE UP (ref 3.8–10.5)
WBC # BLD: 10.1 K/UL — SIGNIFICANT CHANGE UP (ref 3.8–10.5)
WBC # FLD AUTO: 10.1 K/UL — SIGNIFICANT CHANGE UP (ref 3.8–10.5)
WBC # FLD AUTO: 10.1 K/UL — SIGNIFICANT CHANGE UP (ref 3.8–10.5)
WBC UR QL: 1 /HPF — SIGNIFICANT CHANGE UP (ref 0–5)
WBC UR QL: 1 /HPF — SIGNIFICANT CHANGE UP (ref 0–5)

## 2023-11-10 PROCEDURE — 93010 ELECTROCARDIOGRAM REPORT: CPT

## 2023-11-10 PROCEDURE — 71046 X-RAY EXAM CHEST 2 VIEWS: CPT | Mod: 26

## 2023-11-10 PROCEDURE — 93005 ELECTROCARDIOGRAM TRACING: CPT

## 2023-11-10 PROCEDURE — G0463: CPT

## 2023-11-10 PROCEDURE — 71046 X-RAY EXAM CHEST 2 VIEWS: CPT

## 2023-11-10 RX ORDER — ZOLPIDEM TARTRATE 10 MG/1
1 TABLET ORAL
Qty: 0 | Refills: 0 | DISCHARGE

## 2023-11-10 RX ORDER — DILTIAZEM HCL 120 MG
1 CAPSULE, EXT RELEASE 24 HR ORAL
Qty: 0 | Refills: 0 | DISCHARGE

## 2023-11-10 RX ORDER — MUPIROCIN 20 MG/G
1 OINTMENT TOPICAL
Qty: 1 | Refills: 0
Start: 2023-11-10 | End: 2023-11-14

## 2023-11-10 RX ORDER — CEFUROXIME AXETIL 250 MG
1500 TABLET ORAL ONCE
Refills: 0 | Status: DISCONTINUED | OUTPATIENT
Start: 2023-11-29 | End: 2023-11-29

## 2023-11-10 RX ORDER — SODIUM CHLORIDE 9 MG/ML
3 INJECTION INTRAMUSCULAR; INTRAVENOUS; SUBCUTANEOUS EVERY 8 HOURS
Refills: 0 | Status: DISCONTINUED | OUTPATIENT
Start: 2023-11-29 | End: 2023-12-02

## 2023-11-10 RX ORDER — HYDROCHLOROTHIAZIDE 25 MG
1 TABLET ORAL
Qty: 0 | Refills: 0 | DISCHARGE

## 2023-11-10 RX ORDER — POTASSIUM CHLORIDE 20 MEQ
1 PACKET (EA) ORAL
Qty: 0 | Refills: 0 | DISCHARGE

## 2023-11-10 RX ORDER — ROSUVASTATIN CALCIUM 5 MG/1
1 TABLET ORAL
Qty: 0 | Refills: 0 | DISCHARGE

## 2023-11-10 RX ORDER — INFLIXIMAB-DYYB 120 MG/ML
0 INJECTION SUBCUTANEOUS
Qty: 0 | Refills: 0 | DISCHARGE

## 2023-11-10 RX ORDER — GABAPENTIN 400 MG/1
1 CAPSULE ORAL
Qty: 0 | Refills: 0 | DISCHARGE

## 2023-11-10 RX ORDER — ATENOLOL 25 MG/1
1 TABLET ORAL
Qty: 0 | Refills: 0 | DISCHARGE

## 2023-11-10 NOTE — H&P PST ADULT - PATIENT OPTIMIZED PENDING
labs pending, CXR pending labs pending, CXR pending 11/10/23 18:26 urine culture and CXR still pending. Rema MS, FNP-BC

## 2023-11-10 NOTE — H&P PST ADULT - NSHP PST DIAGOTHER LIST_GEN_A_CORE
11/10/23 18:18 All available labs noted as documented. All abnormal labs noted as documented and have been faxed to PCP.  Repeat CBC, BMP and PT/PTT/INR ordered for DOS, MRSA/MSSA +, mupirocin e-scribed as documented, pt. advised to start on 11/24/23 twice daily to both nostrils via phone, pt. made aware of abnormal labs as documented and advised on f/u, and pt. verbalized agreement and understanding.  All abnormal labs emailed to cardiac NPs, Hector Beltran, Rachel Kent, and El.

## 2023-11-10 NOTE — H&P PST ADULT - NSICDXFAMILYHX_GEN_ALL_CORE_FT
FAMILY HISTORY:  Family history of bone cancer  FH: heart disease  FH: Parkinson's disease    Child  Still living? No  FH: Down syndrome, Age at diagnosis: Age Unknown  FH: mitral valve repair, Age at diagnosis: Age Unknown

## 2023-11-10 NOTE — H&P PST ADULT - ASSESSMENT
79 y/o female presents today to PST pending TAVR with Dr. James Shelton on 23 secondary to nonrheumatic aortic valve stenosis. Pt. with history of preDM, DVT and PE on eliquis- denies bleeding or falls, HTN, hypothyroidism, HLD, CAD s/p PCI 2023 on ASA and plavix. Last fall Pt. states 3 weeks ago she found out she had a back fracture and has been wearing a back brace. Pt. states she was in the hospital in 2023 and since then she has had an occasionally productive cough in the morning which lingers towards the afternoon, States she has seen a pulmonary dr. for this. Denies SOB, denies wheezing.  Pt. states she went to the hospital 2023 due to SOB, and had cardiac catheterization at that time, states she was told to have TAVR at that time, but that pt. states her daughter passed away 2023, she was her primary care taker so she could not handle having procedure at that time. Pt. states she has strong social support at home. Denies CP, denies dizziness or lightheadedness. History of breast cancer s/p lumpectomy on left s/p  radiation. BMI 26.7.      consult ordered for DOS.   Pt. educated and instructed regarding all preoperative instructions and education as per policy via both verbal and written means of communication and pt. verbalized agreement and understanding.  Patient educated on surgical scrub, preadmission instructions, medical clearance and day of procedure medications, pt. verbalizes understanding and agreement.  Pt instructed to stop vitamins/supplements/herbal medications/NSAIDS for one week prior to surgery and discuss with PMD.  Pt. advised to continue ASA/Plavix as per surgeon, and pt. verbalized agreement and understanding.   Pt. advised to hold to Eliquis 3 days prior to procedure as surgeon, and pt. verbalized agreement and understanding.     CAPRINI SCORE    AGE RELATED RISK FACTORS                                                             [ ] Age 41-60 years                                            (1 Point)  [ ] Age: 61-74 years                                           (2 Points)                 [x ] Age= 75 years                                                (3 Points)             DISEASE RELATED RISK FACTORS                                                       [ ] Edema in the lower extremities                 (1 Point)                     [ ] Varicose veins                                               (1 Point)                                 [x ] BMI > 25 Kg/m2                                            (1 Point)                                  [ ] Serious infection (ie PNA)                            (1 Point)                     [ ] Lung disease ( COPD, Emphysema)            (1 Point)                                                                          [ ] Acute myocardial infarction                         (1 Point)                  [ ] Congestive heart failure (in the previous month)  (1 Point)         [ ] Inflammatory bowel disease                            (1 Point)                  [ ] Central venous access, PICC or Port               (2 points)       (within the last month)                                                                [ ] Stroke (in the previous month)                        (5 Points)    [x ] Previous or present malignancy                       (2 points)                                                                                                                                                         HEMATOLOGY RELATED FACTORS                                                         [x ] Prior episodes of VTE                                     (3 Points)                     [ ] Positive family history for VTE                      (3 Points)                  [ ] Prothrombin 65925 A                                     (3 Points)                     [ ] Factor V Leiden                                                (3 Points)                        [ ] Lupus anticoagulants                                      (3 Points)                                                           [ ] Anticardiolipin antibodies                              (3 Points)                                                       [ ] High homocysteine in the blood                   (3 Points)                                             [ ] Other congenital or acquired thrombophilia      (3 Points)                                                [ ] Heparin induced thrombocytopenia                  (3 Points)                                        MOBILITY RELATED FACTORS  [ ] Bed rest                                                         (1 Point)  [ ] Plaster cast                                                    (2 points)  [ ] Bed bound for more than 72 hours           (2 Points)    GENDER SPECIFIC FACTORS  [ ] Pregnancy or had a baby within the last month   (1 Point)  [ ] Post-partum < 6 weeks                                   (1 Point)  [ ] Hormonal therapy  or oral contraception   (1 Point)  [ ] History of pregnancy complications              (1 point)  [ ] Unexplained or recurrent              (1 Point)    OTHER RISK FACTORS                                           (1 Point)  [ ] BMI >40, smoking, diabetes requiring insulin, chemotherapy  blood transfusions and length of surgery over 2 hours    SURGERY RELATED RISK FACTORS  [ ]  Section within the last month     (1 Point)  [ ] Minor surgery                                                  (1 Point)  [ ] Arthroscopic surgery                                       (2 Points)  [x ] Planned major surgery lasting more            (2 Points)      than 45 minutes     [ ] Elective hip or knee joint replacement       (5 points)       surgery                                                TRAUMA RELATED RISK FACTORS  [ ] Fracture of the hip, pelvis, or leg                       (5 Points)  [ ] Spinal cord injury resulting in paralysis             (5 points)       (in the previous month)    [ ] Paralysis  (less than 1 month)                             (5 Points)  [ ] Multiple Trauma within 1 month                        (5 Points)    Total Score [ 11       ]    Caprini Score 0-2: Low Risk, NO VTE prophylaxis required for most patients, encourage ambulation  Caprini Score 3-6: Moderate Risk , pharmacologic VTE prophylaxis is indicated for most patients (in the absence of contraindications)  Caprini Score Greater than or =7: High risk, pharmocologic VTE prophylaxis indicated for most patients (in the absence of contraindications)      OPIOID RISK TOOL    YARON EACH BOX THAT APPLIES AND ADD TOTALS AT THE END    FAMILY HISTORY OF SUBSTANCE ABUSE                 FEMALE         MALE                                                Alcohol                             [  ]1 pt          [  ]3pts                                               Illegal Durgs                     [  ]2 pts        [  ]3pts                                               Rx Drugs                           [  ]4 pts        [  ]4 pts    PERSONAL HISTORY OF SUBSTANCE ABUSE                                                                                          Alcohol                             [  ]3 pts       [  ]3 pts                                               Illegal Drugs                     [  ]4 pts        [  ]4 pts                                               Rx Drugs                           [  ]5 pts        [  ]5 pts    AGE BETWEEN 16-45 YEARS                                      [  ]1 pt         [  ]1 pt    HISTORY OF PREADOLESCENT   SEXUAL ABUSE                                                             [  ]3 pts        [  ]0pts    PSYCHOLOGICAL DISEASE                     ADD, OCD, Bipolar, Schizophrenia        [  ]2 pts         [  ]2 pts                      Depression                                               [  ]1 pt           [  ]1 pt           SCORING TOTAL   (add numbers and type here)              (0)                                     A score of 3 or lower indicated LOW risk for future opioid abuse  A score of 4 to 7 indicated moderate risk for future opioid abuse  A score of 8 or higher indicates a high risk for opioid abuse                               79 y/o female presents today to PST pending TAVR with Dr. James Shelton on 23 secondary to nonrheumatic aortic valve stenosis. Pt. with history of preDM, DVT and PE on eliquis- denies bleeding or falls, HTN, hypothyroidism, HLD, CAD s/p PCI 2023 on ASA and plavix. Last fall Pt. states 3 weeks ago she found out she had a back fracture and has been wearing a back brace. Pt. states she was in the hospital in 2023 and since then she has had an occasionally productive cough in the morning which lingers towards the afternoon, States she has seen a pulmonary dr. for this. Denies SOB, denies wheezing.  Pt. states she went to the hospital 2023 due to SOB, and had cardiac catheterization at that time, states she was told to have TAVR at that time, but that pt. states her daughter passed away 2023, she was her primary care taker so she could not handle having procedure at that time. Pt. states she has strong social support at home. Denies CP, denies dizziness or lightheadedness. History of breast cancer s/p lumpectomy on left s/p  radiation. BMI 26.7.      consult ordered for DOS.   Pt. educated and instructed regarding all preoperative instructions and education as per policy via both verbal and written means of communication and pt. verbalized agreement and understanding.  Patient educated on surgical scrub, preadmission instructions, medical clearance and day of procedure medications, pt. verbalizes understanding and agreement.  Pt instructed to stop vitamins/supplements/herbal medications/NSAIDS for one week prior to surgery and discuss with PMD.  Pt. advised to continue ASA/Plavix as per surgeon, and pt. verbalized agreement and understanding.   Pt. advised to hold to Eliquis 3 days prior to procedure as surgeon, and pt. verbalized agreement and understanding.   Hector Beltran, El Wallis, Jacqueline Rubinstein surgeon emailed re. back fracture.     CAPRINI SCORE    AGE RELATED RISK FACTORS                                                             [ ] Age 41-60 years                                            (1 Point)  [ ] Age: 61-74 years                                           (2 Points)                 [x ] Age= 75 years                                                (3 Points)             DISEASE RELATED RISK FACTORS                                                       [ ] Edema in the lower extremities                 (1 Point)                     [ ] Varicose veins                                               (1 Point)                                 [x ] BMI > 25 Kg/m2                                            (1 Point)                                  [ ] Serious infection (ie PNA)                            (1 Point)                     [ ] Lung disease ( COPD, Emphysema)            (1 Point)                                                                          [ ] Acute myocardial infarction                         (1 Point)                  [ ] Congestive heart failure (in the previous month)  (1 Point)         [ ] Inflammatory bowel disease                            (1 Point)                  [ ] Central venous access, PICC or Port               (2 points)       (within the last month)                                                                [ ] Stroke (in the previous month)                        (5 Points)    [x ] Previous or present malignancy                       (2 points)                                                                                                                                                         HEMATOLOGY RELATED FACTORS                                                         [x ] Prior episodes of VTE                                     (3 Points)                     [ ] Positive family history for VTE                      (3 Points)                  [ ] Prothrombin 83838 A                                     (3 Points)                     [ ] Factor V Leiden                                                (3 Points)                        [ ] Lupus anticoagulants                                      (3 Points)                                                           [ ] Anticardiolipin antibodies                              (3 Points)                                                       [ ] High homocysteine in the blood                   (3 Points)                                             [ ] Other congenital or acquired thrombophilia      (3 Points)                                                [ ] Heparin induced thrombocytopenia                  (3 Points)                                        MOBILITY RELATED FACTORS  [ ] Bed rest                                                         (1 Point)  [ ] Plaster cast                                                    (2 points)  [ ] Bed bound for more than 72 hours           (2 Points)    GENDER SPECIFIC FACTORS  [ ] Pregnancy or had a baby within the last month   (1 Point)  [ ] Post-partum < 6 weeks                                   (1 Point)  [ ] Hormonal therapy  or oral contraception   (1 Point)  [ ] History of pregnancy complications              (1 point)  [ ] Unexplained or recurrent              (1 Point)    OTHER RISK FACTORS                                           (1 Point)  [ ] BMI >40, smoking, diabetes requiring insulin, chemotherapy  blood transfusions and length of surgery over 2 hours    SURGERY RELATED RISK FACTORS  [ ]  Section within the last month     (1 Point)  [ ] Minor surgery                                                  (1 Point)  [ ] Arthroscopic surgery                                       (2 Points)  [x ] Planned major surgery lasting more            (2 Points)      than 45 minutes     [ ] Elective hip or knee joint replacement       (5 points)       surgery                                                TRAUMA RELATED RISK FACTORS  [ ] Fracture of the hip, pelvis, or leg                       (5 Points)  [ ] Spinal cord injury resulting in paralysis             (5 points)       (in the previous month)    [ ] Paralysis  (less than 1 month)                             (5 Points)  [ ] Multiple Trauma within 1 month                        (5 Points)    Total Score [ 11       ]    Caprini Score 0-2: Low Risk, NO VTE prophylaxis required for most patients, encourage ambulation  Caprini Score 3-6: Moderate Risk , pharmacologic VTE prophylaxis is indicated for most patients (in the absence of contraindications)  Caprini Score Greater than or =7: High risk, pharmocologic VTE prophylaxis indicated for most patients (in the absence of contraindications)      OPIOID RISK TOOL    YARON EACH BOX THAT APPLIES AND ADD TOTALS AT THE END    FAMILY HISTORY OF SUBSTANCE ABUSE                 FEMALE         MALE                                                Alcohol                             [  ]1 pt          [  ]3pts                                               Illegal Durgs                     [  ]2 pts        [  ]3pts                                               Rx Drugs                           [  ]4 pts        [  ]4 pts    PERSONAL HISTORY OF SUBSTANCE ABUSE                                                                                          Alcohol                             [  ]3 pts       [  ]3 pts                                               Illegal Drugs                     [  ]4 pts        [  ]4 pts                                               Rx Drugs                           [  ]5 pts        [  ]5 pts    AGE BETWEEN 16-45 YEARS                                      [  ]1 pt         [  ]1 pt    HISTORY OF PREADOLESCENT   SEXUAL ABUSE                                                             [  ]3 pts        [  ]0pts    PSYCHOLOGICAL DISEASE                     ADD, OCD, Bipolar, Schizophrenia        [  ]2 pts         [  ]2 pts                      Depression                                               [  ]1 pt           [  ]1 pt           SCORING TOTAL   (add numbers and type here)              (0)                                     A score of 3 or lower indicated LOW risk for future opioid abuse  A score of 4 to 7 indicated moderate risk for future opioid abuse  A score of 8 or higher indicates a high risk for opioid abuse                               79 y/o female presents today to PST pending TAVR with Dr. James Shelton on 23 secondary to nonrheumatic aortic valve stenosis. Pt. with history of preDM, DVT and PE on eliquis- denies bleeding or falls, HTN, hypothyroidism, HLD, CAD s/p PCI 2023 on ASA and plavix. Last fall Pt. states 3 weeks ago she found out she had a back fracture and has been wearing a back brace. Pt. states she was in the hospital in 2023 and since then she has had an occasionally productive cough in the morning which lingers towards the afternoon, States she has seen a pulmonary dr. for this. Denies SOB, denies wheezing.  Pt. states she went to the hospital 2023 due to SOB, and had cardiac catheterization at that time, states she was told to have TAVR at that time, but that pt. states her daughter passed away 2023, she was her primary care taker so she could not handle having procedure at that time. Pt. states she has strong social support at home. Denies CP, denies dizziness or lightheadedness. History of breast cancer s/p lumpectomy on left s/p  radiation. BMI 26.7.     SW consult ordered for DOS.   Pt. educated and instructed regarding all preoperative instructions and education as per policy via both verbal and written means of communication and pt. verbalized agreement and understanding.  Patient educated on surgical scrub, preadmission instructions, medical clearance and day of procedure medications, pt. verbalizes understanding and agreement.  Pt instructed to stop vitamins/supplements/herbal medications/NSAIDS for one week prior to surgery and discuss with PMD.  Pt. advised to continue ASA/Plavix as per surgeon, and pt. verbalized agreement and understanding.   Pt. advised to hold to Eliquis 3 days prior to procedure as surgeon, and pt. verbalized agreement and understanding.   Hector Beltran, El Wallis, Jacqueline Rubinstein surgeon emailed re. back fracture.   Will contact spine surgeon to obtain office note.     CAPRINI SCORE    AGE RELATED RISK FACTORS                                                             [ ] Age 41-60 years                                            (1 Point)  [ ] Age: 61-74 years                                           (2 Points)                 [x ] Age= 75 years                                                (3 Points)             DISEASE RELATED RISK FACTORS                                                       [ ] Edema in the lower extremities                 (1 Point)                     [ ] Varicose veins                                               (1 Point)                                 [x ] BMI > 25 Kg/m2                                            (1 Point)                                  [ ] Serious infection (ie PNA)                            (1 Point)                     [ ] Lung disease ( COPD, Emphysema)            (1 Point)                                                                          [ ] Acute myocardial infarction                         (1 Point)                  [ ] Congestive heart failure (in the previous month)  (1 Point)         [ ] Inflammatory bowel disease                            (1 Point)                  [ ] Central venous access, PICC or Port               (2 points)       (within the last month)                                                                [ ] Stroke (in the previous month)                        (5 Points)    [x ] Previous or present malignancy                       (2 points)                                                                                                                                                         HEMATOLOGY RELATED FACTORS                                                         [x ] Prior episodes of VTE                                     (3 Points)                     [ ] Positive family history for VTE                      (3 Points)                  [ ] Prothrombin 96212 A                                     (3 Points)                     [ ] Factor V Leiden                                                (3 Points)                        [ ] Lupus anticoagulants                                      (3 Points)                                                           [ ] Anticardiolipin antibodies                              (3 Points)                                                       [ ] High homocysteine in the blood                   (3 Points)                                             [ ] Other congenital or acquired thrombophilia      (3 Points)                                                [ ] Heparin induced thrombocytopenia                  (3 Points)                                        MOBILITY RELATED FACTORS  [ ] Bed rest                                                         (1 Point)  [ ] Plaster cast                                                    (2 points)  [ ] Bed bound for more than 72 hours           (2 Points)    GENDER SPECIFIC FACTORS  [ ] Pregnancy or had a baby within the last month   (1 Point)  [ ] Post-partum < 6 weeks                                   (1 Point)  [ ] Hormonal therapy  or oral contraception   (1 Point)  [ ] History of pregnancy complications              (1 point)  [ ] Unexplained or recurrent              (1 Point)    OTHER RISK FACTORS                                           (1 Point)  [ ] BMI >40, smoking, diabetes requiring insulin, chemotherapy  blood transfusions and length of surgery over 2 hours    SURGERY RELATED RISK FACTORS  [ ]  Section within the last month     (1 Point)  [ ] Minor surgery                                                  (1 Point)  [ ] Arthroscopic surgery                                       (2 Points)  [x ] Planned major surgery lasting more            (2 Points)      than 45 minutes     [ ] Elective hip or knee joint replacement       (5 points)       surgery                                                TRAUMA RELATED RISK FACTORS  [ ] Fracture of the hip, pelvis, or leg                       (5 Points)  [ ] Spinal cord injury resulting in paralysis             (5 points)       (in the previous month)    [ ] Paralysis  (less than 1 month)                             (5 Points)  [ ] Multiple Trauma within 1 month                        (5 Points)    Total Score [ 11       ]    Caprini Score 0-2: Low Risk, NO VTE prophylaxis required for most patients, encourage ambulation  Caprini Score 3-6: Moderate Risk , pharmacologic VTE prophylaxis is indicated for most patients (in the absence of contraindications)  Caprini Score Greater than or =7: High risk, pharmocologic VTE prophylaxis indicated for most patients (in the absence of contraindications)      OPIOID RISK TOOL    YARON EACH BOX THAT APPLIES AND ADD TOTALS AT THE END    FAMILY HISTORY OF SUBSTANCE ABUSE                 FEMALE         MALE                                                Alcohol                             [  ]1 pt          [  ]3pts                                               Illegal Durgs                     [  ]2 pts        [  ]3pts                                               Rx Drugs                           [  ]4 pts        [  ]4 pts    PERSONAL HISTORY OF SUBSTANCE ABUSE                                                                                          Alcohol                             [  ]3 pts       [  ]3 pts                                               Illegal Drugs                     [  ]4 pts        [  ]4 pts                                               Rx Drugs                           [  ]5 pts        [  ]5 pts    AGE BETWEEN 16-45 YEARS                                      [  ]1 pt         [  ]1 pt    HISTORY OF PREADOLESCENT   SEXUAL ABUSE                                                             [  ]3 pts        [  ]0pts    PSYCHOLOGICAL DISEASE                     ADD, OCD, Bipolar, Schizophrenia        [  ]2 pts         [  ]2 pts                      Depression                                               [  ]1 pt           [  ]1 pt           SCORING TOTAL   (add numbers and type here)              (0)                                     A score of 3 or lower indicated LOW risk for future opioid abuse  A score of 4 to 7 indicated moderate risk for future opioid abuse  A score of 8 or higher indicates a high risk for opioid abuse                               77 y/o female presents today to PST pending TAVR with Dr. James Shelton on 23 secondary to nonrheumatic aortic valve stenosis. Pt. with history of preDM, DVT and PE on eliquis- denies bleeding or falls, HTN, hypothyroidism, HLD, CAD s/p PCI 2023 on ASA and plavix. Last fall Pt. states 3 weeks ago she found out she had a back fracture and has been wearing a back brace. Pt. states she was in the hospital in 2023 and since then she has had an occasionally productive cough in the morning which lingers towards the afternoon, States she has seen a pulmonary dr. for this. Denies SOB, denies wheezing.  Pt. states she went to the hospital 2023 due to SOB, and had cardiac catheterization at that time, states she was told to have TAVR at that time, but that pt. states her daughter passed away 2023, she was her primary care taker so she could not handle having procedure at that time. Pt. states she has strong social support at home. Denies CP, denies dizziness or lightheadedness. History of breast cancer s/p lumpectomy on left s/p  radiation. BMI 26.7.     SW and pain consult ordered for DOS.   Pt. educated and instructed regarding all preoperative instructions and education as per policy via both verbal and written means of communication and pt. verbalized agreement and understanding.  Patient educated on surgical scrub, preadmission instructions, medical clearance and day of procedure medications, pt. verbalizes understanding and agreement.  Pt instructed to stop vitamins/supplements/herbal medications/NSAIDS for one week prior to surgery and discuss with PMD.  Pt. advised to continue ASA/Plavix as per surgeon, and pt. verbalized agreement and understanding.   Pt. advised to hold to Eliquis 3 days prior to procedure as surgeon, and pt. verbalized agreement and understanding.   Hector Beltran, El Wallis, Jacqueline Rubinstein surgeon emailed re. back fracture.   Will contact spine surgeon to obtain office note.     CAPRINI SCORE    AGE RELATED RISK FACTORS                                                             [ ] Age 41-60 years                                            (1 Point)  [ ] Age: 61-74 years                                           (2 Points)                 [x ] Age= 75 years                                                (3 Points)             DISEASE RELATED RISK FACTORS                                                       [ ] Edema in the lower extremities                 (1 Point)                     [ ] Varicose veins                                               (1 Point)                                 [x ] BMI > 25 Kg/m2                                            (1 Point)                                  [ ] Serious infection (ie PNA)                            (1 Point)                     [ ] Lung disease ( COPD, Emphysema)            (1 Point)                                                                          [ ] Acute myocardial infarction                         (1 Point)                  [ ] Congestive heart failure (in the previous month)  (1 Point)         [ ] Inflammatory bowel disease                            (1 Point)                  [ ] Central venous access, PICC or Port               (2 points)       (within the last month)                                                                [ ] Stroke (in the previous month)                        (5 Points)    [x ] Previous or present malignancy                       (2 points)                                                                                                                                                         HEMATOLOGY RELATED FACTORS                                                         [x ] Prior episodes of VTE                                     (3 Points)                     [ ] Positive family history for VTE                      (3 Points)                  [ ] Prothrombin 19866 A                                     (3 Points)                     [ ] Factor V Leiden                                                (3 Points)                        [ ] Lupus anticoagulants                                      (3 Points)                                                           [ ] Anticardiolipin antibodies                              (3 Points)                                                       [ ] High homocysteine in the blood                   (3 Points)                                             [ ] Other congenital or acquired thrombophilia      (3 Points)                                                [ ] Heparin induced thrombocytopenia                  (3 Points)                                        MOBILITY RELATED FACTORS  [ ] Bed rest                                                         (1 Point)  [ ] Plaster cast                                                    (2 points)  [ ] Bed bound for more than 72 hours           (2 Points)    GENDER SPECIFIC FACTORS  [ ] Pregnancy or had a baby within the last month   (1 Point)  [ ] Post-partum < 6 weeks                                   (1 Point)  [ ] Hormonal therapy  or oral contraception   (1 Point)  [ ] History of pregnancy complications              (1 point)  [ ] Unexplained or recurrent              (1 Point)    OTHER RISK FACTORS                                           (1 Point)  [ ] BMI >40, smoking, diabetes requiring insulin, chemotherapy  blood transfusions and length of surgery over 2 hours    SURGERY RELATED RISK FACTORS  [ ]  Section within the last month     (1 Point)  [ ] Minor surgery                                                  (1 Point)  [ ] Arthroscopic surgery                                       (2 Points)  [x ] Planned major surgery lasting more            (2 Points)      than 45 minutes     [ ] Elective hip or knee joint replacement       (5 points)       surgery                                                TRAUMA RELATED RISK FACTORS  [ ] Fracture of the hip, pelvis, or leg                       (5 Points)  [ ] Spinal cord injury resulting in paralysis             (5 points)       (in the previous month)    [ ] Paralysis  (less than 1 month)                             (5 Points)  [ ] Multiple Trauma within 1 month                        (5 Points)    Total Score [ 11       ]    Caprini Score 0-2: Low Risk, NO VTE prophylaxis required for most patients, encourage ambulation  Caprini Score 3-6: Moderate Risk , pharmacologic VTE prophylaxis is indicated for most patients (in the absence of contraindications)  Caprini Score Greater than or =7: High risk, pharmocologic VTE prophylaxis indicated for most patients (in the absence of contraindications)      OPIOID RISK TOOL    YARON EACH BOX THAT APPLIES AND ADD TOTALS AT THE END    FAMILY HISTORY OF SUBSTANCE ABUSE                 FEMALE         MALE                                                Alcohol                             [  ]1 pt          [  ]3pts                                               Illegal Durgs                     [  ]2 pts        [  ]3pts                                               Rx Drugs                           [  ]4 pts        [  ]4 pts    PERSONAL HISTORY OF SUBSTANCE ABUSE                                                                                          Alcohol                             [  ]3 pts       [  ]3 pts                                               Illegal Drugs                     [  ]4 pts        [  ]4 pts                                               Rx Drugs                           [  ]5 pts        [  ]5 pts    AGE BETWEEN 16-45 YEARS                                      [  ]1 pt         [  ]1 pt    HISTORY OF PREADOLESCENT   SEXUAL ABUSE                                                             [  ]3 pts        [  ]0pts    PSYCHOLOGICAL DISEASE                     ADD, OCD, Bipolar, Schizophrenia        [  ]2 pts         [  ]2 pts                      Depression                                               [  ]1 pt           [  ]1 pt           SCORING TOTAL   (add numbers and type here)              (0)                                     A score of 3 or lower indicated LOW risk for future opioid abuse  A score of 4 to 7 indicated moderate risk for future opioid abuse  A score of 8 or higher indicates a high risk for opioid abuse                               77 y/o female presents today to PST pending TAVR with Dr. James Shelton on 23 secondary to nonrheumatic aortic valve stenosis. Pt. with history of preDM, DVT and PE on eliquis- denies bleeding or falls, HTN, hypothyroidism, HLD, CAD s/p PCI 2023 on ASA and plavix. Last fall Pt. states 3 weeks ago she found out she had a back fracture and has been wearing a back brace. Pt. states she was in the hospital in 2023 and since then she has had an occasionally productive cough in the morning which lingers towards the afternoon, States she has seen a pulmonary dr. for this. Denies SOB, denies wheezing.  Pt. states she went to the hospital 2023 due to SOB, and had cardiac catheterization at that time, states she was told to have TAVR at that time, but that pt. states her daughter passed away 2023, she was her primary care taker so she could not handle having procedure at that time. Pt. states she has strong social support at home. Denies CP, denies dizziness or lightheadedness. History of breast cancer s/p lumpectomy on left s/p  radiation. History of ankylosing spondylitis pt. states previously on remicaid and no longer on it since 2023 she states.  BMI 26.7.     SW and pain consult ordered for DOS.   Pt. educated and instructed regarding all preoperative instructions and education as per policy via both verbal and written means of communication and pt. verbalized agreement and understanding.  Patient educated on surgical scrub, preadmission instructions, medical clearance and day of procedure medications, pt. verbalizes understanding and agreement.  Pt instructed to stop vitamins/supplements/herbal medications/NSAIDS for one week prior to surgery and discuss with PMD.  Pt. advised to continue ASA/Plavix as per surgeon, and pt. verbalized agreement and understanding.   Pt. advised to hold to Eliquis 3 days prior to procedure as surgeon, and pt. verbalized agreement and understanding.   Hector Beltran, El Wallis, Jacqueline Rubinstein surgeon emailed re. back fracture.   Will contact spine surgeon to obtain office note.     CAPRINI SCORE    AGE RELATED RISK FACTORS                                                             [ ] Age 41-60 years                                            (1 Point)  [ ] Age: 61-74 years                                           (2 Points)                 [x ] Age= 75 years                                                (3 Points)             DISEASE RELATED RISK FACTORS                                                       [ ] Edema in the lower extremities                 (1 Point)                     [ ] Varicose veins                                               (1 Point)                                 [x ] BMI > 25 Kg/m2                                            (1 Point)                                  [ ] Serious infection (ie PNA)                            (1 Point)                     [ ] Lung disease ( COPD, Emphysema)            (1 Point)                                                                          [ ] Acute myocardial infarction                         (1 Point)                  [ ] Congestive heart failure (in the previous month)  (1 Point)         [ ] Inflammatory bowel disease                            (1 Point)                  [ ] Central venous access, PICC or Port               (2 points)       (within the last month)                                                                [ ] Stroke (in the previous month)                        (5 Points)    [x ] Previous or present malignancy                       (2 points)                                                                                                                                                         HEMATOLOGY RELATED FACTORS                                                         [x ] Prior episodes of VTE                                     (3 Points)                     [ ] Positive family history for VTE                      (3 Points)                  [ ] Prothrombin 52169 A                                     (3 Points)                     [ ] Factor V Leiden                                                (3 Points)                        [ ] Lupus anticoagulants                                      (3 Points)                                                           [ ] Anticardiolipin antibodies                              (3 Points)                                                       [ ] High homocysteine in the blood                   (3 Points)                                             [ ] Other congenital or acquired thrombophilia      (3 Points)                                                [ ] Heparin induced thrombocytopenia                  (3 Points)                                        MOBILITY RELATED FACTORS  [ ] Bed rest                                                         (1 Point)  [ ] Plaster cast                                                    (2 points)  [ ] Bed bound for more than 72 hours           (2 Points)    GENDER SPECIFIC FACTORS  [ ] Pregnancy or had a baby within the last month   (1 Point)  [ ] Post-partum < 6 weeks                                   (1 Point)  [ ] Hormonal therapy  or oral contraception   (1 Point)  [ ] History of pregnancy complications              (1 point)  [ ] Unexplained or recurrent              (1 Point)    OTHER RISK FACTORS                                           (1 Point)  [ ] BMI >40, smoking, diabetes requiring insulin, chemotherapy  blood transfusions and length of surgery over 2 hours    SURGERY RELATED RISK FACTORS  [ ]  Section within the last month     (1 Point)  [ ] Minor surgery                                                  (1 Point)  [ ] Arthroscopic surgery                                       (2 Points)  [x ] Planned major surgery lasting more            (2 Points)      than 45 minutes     [ ] Elective hip or knee joint replacement       (5 points)       surgery                                                TRAUMA RELATED RISK FACTORS  [ ] Fracture of the hip, pelvis, or leg                       (5 Points)  [ ] Spinal cord injury resulting in paralysis             (5 points)       (in the previous month)    [ ] Paralysis  (less than 1 month)                             (5 Points)  [ ] Multiple Trauma within 1 month                        (5 Points)    Total Score [ 11       ]    Caprini Score 0-2: Low Risk, NO VTE prophylaxis required for most patients, encourage ambulation  Caprini Score 3-6: Moderate Risk , pharmacologic VTE prophylaxis is indicated for most patients (in the absence of contraindications)  Caprini Score Greater than or =7: High risk, pharmocologic VTE prophylaxis indicated for most patients (in the absence of contraindications)      OPIOID RISK TOOL    YARON EACH BOX THAT APPLIES AND ADD TOTALS AT THE END    FAMILY HISTORY OF SUBSTANCE ABUSE                 FEMALE         MALE                                                Alcohol                             [  ]1 pt          [  ]3pts                                               Illegal Durgs                     [  ]2 pts        [  ]3pts                                               Rx Drugs                           [  ]4 pts        [  ]4 pts    PERSONAL HISTORY OF SUBSTANCE ABUSE                                                                                          Alcohol                             [  ]3 pts       [  ]3 pts                                               Illegal Drugs                     [  ]4 pts        [  ]4 pts                                               Rx Drugs                           [  ]5 pts        [  ]5 pts    AGE BETWEEN 16-45 YEARS                                      [  ]1 pt         [  ]1 pt    HISTORY OF PREADOLESCENT   SEXUAL ABUSE                                                             [  ]3 pts        [  ]0pts    PSYCHOLOGICAL DISEASE                     ADD, OCD, Bipolar, Schizophrenia        [  ]2 pts         [  ]2 pts                      Depression                                               [  ]1 pt           [  ]1 pt           SCORING TOTAL   (add numbers and type here)              (0)                                     A score of 3 or lower indicated LOW risk for future opioid abuse  A score of 4 to 7 indicated moderate risk for future opioid abuse  A score of 8 or higher indicates a high risk for opioid abuse

## 2023-11-10 NOTE — H&P PST ADULT - HISTORY OF PRESENT ILLNESS
79 y/o female presents today to PST pending TAVR with Dr. James Shelton on 11/29/23 secondary to nonrheumatic aortic valve stenosis. Pt. with history of preDM, DVT and PE on eliquis- denies bleeding or falls, HTN, hypothyroidism, HLD, CAD s/p PCI 6/2023 on ASA and plavix. Last fall Pt. states 3 weeks ago she found out she had a back fracture and has been wearing a back brace. Pt. states she was in the hospital in June 2023 and since then she has had an occasionally productive cough in the morning which lingers towards the afternoon, States she has seen a pulmonary dr. for this. Denies SOB, denies wheezing.  Pt. states she went to the hospital 6/2023 due to SOB, and had cardiac catheterization at that time, states she was told to have TAVR at that time, but that pt. states her daughter passed away 9/2023, she was her primary care taker so she could not handle having procedure at that time. Denies CP, denies dizziness or lightheadedness. History of breast cancer s/p lumpectomy on left s/p  radiation. 79 y/o female presents today to PST pending TAVR with Dr. James Shelton on 11/29/23 secondary to nonrheumatic aortic valve stenosis. Pt. with history of preDM, DVT and PE on eliquis- denies bleeding or falls, HTN, hypothyroidism, HLD, CAD s/p PCI 6/2023 on ASA and plavix. Last fall Pt. states 3 weeks ago she found out she had a back fracture and has been wearing a back brace. Pt. states she was in the hospital in June 2023 and since then she has had an occasionally productive cough in the morning which lingers towards the afternoon, States she has seen a pulmonary dr. for this. Denies SOB, denies wheezing.  Pt. states she went to the hospital 6/2023 due to SOB, and had cardiac catheterization at that time, states she was told to have TAVR at that time, but that pt. states her daughter passed away 9/2023, she was her primary care taker so she could not handle having procedure at that time. Denies CP, denies dizziness or lightheadedness. History of breast cancer s/p lumpectomy on left s/p radiation. 79 y/o female presents today to PST pending TAVR with Dr. James Shelton on 11/29/23 secondary to nonrheumatic aortic valve stenosis. Pt. with history of preDM, DVT and PE on eliquis- denies bleeding or falls, HTN, hypothyroidism, HLD, CAD s/p PCI 6/2023 on ASA and plavix. Last fall Pt. states 3 weeks ago she found out she had a back fracture and has been wearing a back brace. Pt. states she was in the hospital in June 2023 and since then she has had an occasionally productive cough in the morning which lingers towards the afternoon, States she has seen a pulmonary dr. for this. Denies SOB, denies wheezing.  Pt. states she went to the hospital 6/2023 due to SOB, and had cardiac catheterization at that time, states she was told to have TAVR at that time, but that pt. states her daughter passed away 9/2023, she was her primary care taker so she could not handle having procedure at that time. Denies CP, denies dizziness or lightheadedness. History of breast cancer s/p lumpectomy on left s/p radiation. History of ankylosing spondylitis pt. states previously on remicaid and no longer on it since 4/2023 she states.

## 2023-11-10 NOTE — H&P PST ADULT - CARDIOVASCULAR
negative normal/regular rate and rhythm/S1 S2 present/no gallops/no rub/no murmur/no JVD/normal PMI/no pedal edema normal/regular rate and rhythm/S1 S2 present/no gallops/no rub/no JVD/normal PMI/no pedal edema/murmur

## 2023-11-10 NOTE — H&P PST ADULT - NEUROLOGICAL
normal/cranial nerves II-XII intact/sensation intact/cranial nerves intact/no spontaneous movement/superficial reflexes intact

## 2023-11-10 NOTE — H&P PST ADULT - RESPIRATORY
normal/clear to auscultation bilaterally/no wheezes/no rales/no rhonchi/no respiratory distress normal/clear to auscultation bilaterally/no wheezes/no rales/no rhonchi/no respiratory distress/no use of accessory muscles/airway patent/breath sounds equal/good air movement/respirations non-labored/no intercostal retractions

## 2023-11-10 NOTE — H&P PST ADULT - NSICDXPASTSURGICALHX_GEN_ALL_CORE_FT
PAST SURGICAL HISTORY:  H/O laminectomy lumbar 1992    H/O total hysterectomy 1987    History of cholecystectomy 1979    History of lumpectomy left 2006    S/P tonsillectomy

## 2023-11-10 NOTE — H&P PST ADULT - EKG AND INTERPRETATION
EKG NSR 65 BPM pending final cardiac optimization, EKG faxed to PCP and cardiology. Pt. advised to follow up with PCP/Cardio. re. EKG and pt. agreed.

## 2023-11-10 NOTE — H&P PST ADULT - GASTROINTESTINAL
details… normal/soft/nontender/nondistended/normal active bowel sounds/no guarding/no rigidity/no organomegaly/no palpable gabe/no masses palpable

## 2023-11-10 NOTE — H&P PST ADULT - FUNCTIONAL STATUS
Pt. unable to tolerate movement to back pain,  pt. unable to walk up a flight of stairs or walk more than one block. Denies CP or SOB with mild physical exertion./less than 4 METS

## 2023-11-10 NOTE — H&P PST ADULT - NSICDXPASTMEDICALHX_GEN_ALL_CORE_FT
PAST MEDICAL HISTORY:  Breast cancer 2006, surgery, radiation and hormone therapy    CAD S/P percutaneous coronary angioplasty     COVID-19 vaccine series completed     DVT (deep venous thrombosis)     H/O hyperkalemia     Hyperlipidemia     Hypertension     Hypothyroidism     Nonrheumatic aortic valve stenosis     Prediabetes     Pulmonary embolism     Skin cancer      PAST MEDICAL HISTORY:  Breast cancer 2006, surgery, radiation and hormone therapy    CAD S/P percutaneous coronary angioplasty     COVID-19 vaccine series completed     DVT (deep venous thrombosis)     Former smoker     H/O ankylosing spondylitis     H/O hyperkalemia     Hyperlipidemia     Hypertension     Hypothyroidism     Nonrheumatic aortic valve stenosis     Prediabetes     Pulmonary embolism     Skin cancer

## 2023-11-10 NOTE — H&P PST ADULT - OTHER CARE PROVIDERS
Pulmonary, Cardiology/PCP Dr. Stevens [phone] Pulmonary, Cardiology/PCP Dr. Stevens [phone], Spine Surgeon Dr. Branden Panchal [phone]

## 2023-11-10 NOTE — H&P PST ADULT - NEGATIVE MUSCULOSKELETAL SYMPTOMS
no arthralgia/no arthritis/no joint swelling/no myalgia/no muscle weakness/no stiffness/no neck pain/no arm pain L/no arm pain R

## 2023-11-13 LAB
-  AMPICILLIN: SIGNIFICANT CHANGE UP
-  AMPICILLIN: SIGNIFICANT CHANGE UP
-  CIPROFLOXACIN: SIGNIFICANT CHANGE UP
-  CIPROFLOXACIN: SIGNIFICANT CHANGE UP
-  LEVOFLOXACIN: SIGNIFICANT CHANGE UP
-  LEVOFLOXACIN: SIGNIFICANT CHANGE UP
-  NITROFURANTOIN: SIGNIFICANT CHANGE UP
-  NITROFURANTOIN: SIGNIFICANT CHANGE UP
-  TETRACYCLINE: SIGNIFICANT CHANGE UP
-  TETRACYCLINE: SIGNIFICANT CHANGE UP
-  VANCOMYCIN: SIGNIFICANT CHANGE UP
-  VANCOMYCIN: SIGNIFICANT CHANGE UP
CULTURE RESULTS: ABNORMAL
CULTURE RESULTS: ABNORMAL
METHOD TYPE: SIGNIFICANT CHANGE UP
METHOD TYPE: SIGNIFICANT CHANGE UP
ORGANISM # SPEC MICROSCOPIC CNT: ABNORMAL
ORGANISM # SPEC MICROSCOPIC CNT: ABNORMAL
ORGANISM # SPEC MICROSCOPIC CNT: SIGNIFICANT CHANGE UP
ORGANISM # SPEC MICROSCOPIC CNT: SIGNIFICANT CHANGE UP
SPECIMEN SOURCE: SIGNIFICANT CHANGE UP
SPECIMEN SOURCE: SIGNIFICANT CHANGE UP

## 2023-11-28 ENCOUNTER — TRANSCRIPTION ENCOUNTER (OUTPATIENT)
Age: 78
End: 2023-11-28

## 2023-11-29 ENCOUNTER — INPATIENT (INPATIENT)
Facility: HOSPITAL | Age: 78
LOS: 2 days | Discharge: HOME HEALTH | DRG: 267 | End: 2023-12-02
Attending: THORACIC SURGERY (CARDIOTHORACIC VASCULAR SURGERY) | Admitting: THORACIC SURGERY (CARDIOTHORACIC VASCULAR SURGERY)
Payer: MEDICARE

## 2023-11-29 ENCOUNTER — APPOINTMENT (OUTPATIENT)
Dept: CARDIOTHORACIC SURGERY | Facility: CLINIC | Age: 78
End: 2023-11-29
Payer: MEDICARE

## 2023-11-29 ENCOUNTER — TRANSCRIPTION ENCOUNTER (OUTPATIENT)
Age: 78
End: 2023-11-29

## 2023-11-29 ENCOUNTER — APPOINTMENT (OUTPATIENT)
Dept: CARDIOTHORACIC SURGERY | Facility: HOSPITAL | Age: 78
End: 2023-11-29

## 2023-11-29 VITALS
TEMPERATURE: 98 F | RESPIRATION RATE: 16 BRPM | WEIGHT: 134.7 LBS | OXYGEN SATURATION: 100 % | HEIGHT: 60 IN | HEART RATE: 65 BPM | DIASTOLIC BLOOD PRESSURE: 55 MMHG | SYSTOLIC BLOOD PRESSURE: 133 MMHG

## 2023-11-29 DIAGNOSIS — Z90.710 ACQUIRED ABSENCE OF BOTH CERVIX AND UTERUS: Chronic | ICD-10-CM

## 2023-11-29 DIAGNOSIS — Z98.890 OTHER SPECIFIED POSTPROCEDURAL STATES: Chronic | ICD-10-CM

## 2023-11-29 DIAGNOSIS — I35.0 NONRHEUMATIC AORTIC (VALVE) STENOSIS: ICD-10-CM

## 2023-11-29 DIAGNOSIS — Z90.89 ACQUIRED ABSENCE OF OTHER ORGANS: Chronic | ICD-10-CM

## 2023-11-29 DIAGNOSIS — Z90.49 ACQUIRED ABSENCE OF OTHER SPECIFIED PARTS OF DIGESTIVE TRACT: Chronic | ICD-10-CM

## 2023-11-29 LAB
ALBUMIN SERPL ELPH-MCNC: 3.1 G/DL — LOW (ref 3.3–5.2)
ALBUMIN SERPL ELPH-MCNC: 3.1 G/DL — LOW (ref 3.3–5.2)
ALBUMIN SERPL ELPH-MCNC: 3.5 G/DL — SIGNIFICANT CHANGE UP (ref 3.3–5.2)
ALBUMIN SERPL ELPH-MCNC: 3.5 G/DL — SIGNIFICANT CHANGE UP (ref 3.3–5.2)
ALP SERPL-CCNC: 66 U/L — SIGNIFICANT CHANGE UP (ref 40–120)
ALP SERPL-CCNC: 66 U/L — SIGNIFICANT CHANGE UP (ref 40–120)
ALP SERPL-CCNC: 76 U/L — SIGNIFICANT CHANGE UP (ref 40–120)
ALP SERPL-CCNC: 76 U/L — SIGNIFICANT CHANGE UP (ref 40–120)
ALT FLD-CCNC: 10 U/L — SIGNIFICANT CHANGE UP
ALT FLD-CCNC: 10 U/L — SIGNIFICANT CHANGE UP
ALT FLD-CCNC: 8 U/L — SIGNIFICANT CHANGE UP
ALT FLD-CCNC: 8 U/L — SIGNIFICANT CHANGE UP
ANION GAP SERPL CALC-SCNC: 17 MMOL/L — SIGNIFICANT CHANGE UP (ref 5–17)
ANION GAP SERPL CALC-SCNC: 17 MMOL/L — SIGNIFICANT CHANGE UP (ref 5–17)
ANION GAP SERPL CALC-SCNC: 20 MMOL/L — HIGH (ref 5–17)
ANION GAP SERPL CALC-SCNC: 20 MMOL/L — HIGH (ref 5–17)
APTT BLD: 42.6 SEC — HIGH (ref 24.5–35.6)
APTT BLD: 42.6 SEC — HIGH (ref 24.5–35.6)
APTT BLD: 43 SEC — HIGH (ref 24.5–35.6)
APTT BLD: 43 SEC — HIGH (ref 24.5–35.6)
APTT BLD: 45.1 SEC — HIGH (ref 24.5–35.6)
APTT BLD: 45.1 SEC — HIGH (ref 24.5–35.6)
AST SERPL-CCNC: 19 U/L — SIGNIFICANT CHANGE UP
AST SERPL-CCNC: 19 U/L — SIGNIFICANT CHANGE UP
AST SERPL-CCNC: 23 U/L — SIGNIFICANT CHANGE UP
AST SERPL-CCNC: 23 U/L — SIGNIFICANT CHANGE UP
BASE EXCESS BLDA CALC-SCNC: -1.1 MMOL/L — SIGNIFICANT CHANGE UP (ref -2–3)
BASE EXCESS BLDA CALC-SCNC: -1.1 MMOL/L — SIGNIFICANT CHANGE UP (ref -2–3)
BASE EXCESS BLDA CALC-SCNC: -2.2 MMOL/L — LOW (ref -2–3)
BASE EXCESS BLDA CALC-SCNC: -2.2 MMOL/L — LOW (ref -2–3)
BASE EXCESS BLDA CALC-SCNC: 1.4 MMOL/L — SIGNIFICANT CHANGE UP (ref -2–3)
BASE EXCESS BLDA CALC-SCNC: 1.4 MMOL/L — SIGNIFICANT CHANGE UP (ref -2–3)
BASOPHILS # BLD AUTO: 0.04 K/UL — SIGNIFICANT CHANGE UP (ref 0–0.2)
BASOPHILS # BLD AUTO: 0.04 K/UL — SIGNIFICANT CHANGE UP (ref 0–0.2)
BASOPHILS # BLD AUTO: 0.06 K/UL — SIGNIFICANT CHANGE UP (ref 0–0.2)
BASOPHILS # BLD AUTO: 0.06 K/UL — SIGNIFICANT CHANGE UP (ref 0–0.2)
BASOPHILS NFR BLD AUTO: 0.5 % — SIGNIFICANT CHANGE UP (ref 0–2)
BASOPHILS NFR BLD AUTO: 0.5 % — SIGNIFICANT CHANGE UP (ref 0–2)
BASOPHILS NFR BLD AUTO: 0.8 % — SIGNIFICANT CHANGE UP (ref 0–2)
BASOPHILS NFR BLD AUTO: 0.8 % — SIGNIFICANT CHANGE UP (ref 0–2)
BILIRUB SERPL-MCNC: 0.3 MG/DL — LOW (ref 0.4–2)
BUN SERPL-MCNC: 82.5 MG/DL — HIGH (ref 8–20)
BUN SERPL-MCNC: 82.5 MG/DL — HIGH (ref 8–20)
BUN SERPL-MCNC: 86.5 MG/DL — HIGH (ref 8–20)
BUN SERPL-MCNC: 86.5 MG/DL — HIGH (ref 8–20)
CA-I BLDA-SCNC: 1.12 MMOL/L — LOW (ref 1.15–1.33)
CA-I BLDA-SCNC: 1.12 MMOL/L — LOW (ref 1.15–1.33)
CA-I BLDA-SCNC: 1.14 MMOL/L — LOW (ref 1.15–1.33)
CALCIUM SERPL-MCNC: 8.3 MG/DL — LOW (ref 8.4–10.5)
CALCIUM SERPL-MCNC: 8.3 MG/DL — LOW (ref 8.4–10.5)
CALCIUM SERPL-MCNC: 8.6 MG/DL — SIGNIFICANT CHANGE UP (ref 8.4–10.5)
CALCIUM SERPL-MCNC: 8.6 MG/DL — SIGNIFICANT CHANGE UP (ref 8.4–10.5)
CHLORIDE BLDA-SCNC: 100 MMOL/L — SIGNIFICANT CHANGE UP (ref 96–108)
CHLORIDE BLDA-SCNC: 101 MMOL/L — SIGNIFICANT CHANGE UP (ref 96–108)
CHLORIDE BLDA-SCNC: 101 MMOL/L — SIGNIFICANT CHANGE UP (ref 96–108)
CHLORIDE SERPL-SCNC: 93 MMOL/L — LOW (ref 96–108)
CHLORIDE SERPL-SCNC: 93 MMOL/L — LOW (ref 96–108)
CHLORIDE SERPL-SCNC: 99 MMOL/L — SIGNIFICANT CHANGE UP (ref 96–108)
CHLORIDE SERPL-SCNC: 99 MMOL/L — SIGNIFICANT CHANGE UP (ref 96–108)
CK SERPL-CCNC: 60 U/L — SIGNIFICANT CHANGE UP (ref 25–170)
CK SERPL-CCNC: 60 U/L — SIGNIFICANT CHANGE UP (ref 25–170)
CO2 SERPL-SCNC: 22 MMOL/L — SIGNIFICANT CHANGE UP (ref 22–29)
COHGB MFR BLDA: 1.5 % — SIGNIFICANT CHANGE UP
COHGB MFR BLDA: 2.2 % — SIGNIFICANT CHANGE UP
COHGB MFR BLDA: 2.2 % — SIGNIFICANT CHANGE UP
CREAT SERPL-MCNC: 4.38 MG/DL — HIGH (ref 0.5–1.3)
CREAT SERPL-MCNC: 4.38 MG/DL — HIGH (ref 0.5–1.3)
CREAT SERPL-MCNC: 4.74 MG/DL — HIGH (ref 0.5–1.3)
CREAT SERPL-MCNC: 4.74 MG/DL — HIGH (ref 0.5–1.3)
EGFR: 10 ML/MIN/1.73M2 — LOW
EGFR: 10 ML/MIN/1.73M2 — LOW
EGFR: 9 ML/MIN/1.73M2 — LOW
EGFR: 9 ML/MIN/1.73M2 — LOW
EOSINOPHIL # BLD AUTO: 0.38 K/UL — SIGNIFICANT CHANGE UP (ref 0–0.5)
EOSINOPHIL # BLD AUTO: 0.38 K/UL — SIGNIFICANT CHANGE UP (ref 0–0.5)
EOSINOPHIL # BLD AUTO: 0.4 K/UL — SIGNIFICANT CHANGE UP (ref 0–0.5)
EOSINOPHIL # BLD AUTO: 0.4 K/UL — SIGNIFICANT CHANGE UP (ref 0–0.5)
EOSINOPHIL NFR BLD AUTO: 4.4 % — SIGNIFICANT CHANGE UP (ref 0–6)
EOSINOPHIL NFR BLD AUTO: 4.4 % — SIGNIFICANT CHANGE UP (ref 0–6)
EOSINOPHIL NFR BLD AUTO: 5.6 % — SIGNIFICANT CHANGE UP (ref 0–6)
EOSINOPHIL NFR BLD AUTO: 5.6 % — SIGNIFICANT CHANGE UP (ref 0–6)
GAS PNL BLDA: SIGNIFICANT CHANGE UP
GAS PNL BLDA: SIGNIFICANT CHANGE UP
GLUCOSE BLDA-MCNC: 75 MG/DL — SIGNIFICANT CHANGE UP (ref 70–99)
GLUCOSE BLDA-MCNC: 75 MG/DL — SIGNIFICANT CHANGE UP (ref 70–99)
GLUCOSE BLDA-MCNC: 77 MG/DL — SIGNIFICANT CHANGE UP (ref 70–99)
GLUCOSE BLDA-MCNC: 77 MG/DL — SIGNIFICANT CHANGE UP (ref 70–99)
GLUCOSE BLDA-MCNC: 81 MG/DL — SIGNIFICANT CHANGE UP (ref 70–99)
GLUCOSE BLDA-MCNC: 81 MG/DL — SIGNIFICANT CHANGE UP (ref 70–99)
GLUCOSE BLDC GLUCOMTR-MCNC: 79 MG/DL — SIGNIFICANT CHANGE UP (ref 70–99)
GLUCOSE BLDC GLUCOMTR-MCNC: 79 MG/DL — SIGNIFICANT CHANGE UP (ref 70–99)
GLUCOSE BLDC GLUCOMTR-MCNC: 84 MG/DL — SIGNIFICANT CHANGE UP (ref 70–99)
GLUCOSE BLDC GLUCOMTR-MCNC: 84 MG/DL — SIGNIFICANT CHANGE UP (ref 70–99)
GLUCOSE SERPL-MCNC: 70 MG/DL — SIGNIFICANT CHANGE UP (ref 70–99)
GLUCOSE SERPL-MCNC: 70 MG/DL — SIGNIFICANT CHANGE UP (ref 70–99)
GLUCOSE SERPL-MCNC: 88 MG/DL — SIGNIFICANT CHANGE UP (ref 70–99)
GLUCOSE SERPL-MCNC: 88 MG/DL — SIGNIFICANT CHANGE UP (ref 70–99)
HCO3 BLDA-SCNC: 23 MMOL/L — SIGNIFICANT CHANGE UP (ref 21–28)
HCO3 BLDA-SCNC: 26 MMOL/L — SIGNIFICANT CHANGE UP (ref 21–28)
HCO3 BLDA-SCNC: 26 MMOL/L — SIGNIFICANT CHANGE UP (ref 21–28)
HCT VFR BLD CALC: 28 % — LOW (ref 34.5–45)
HCT VFR BLD CALC: 28 % — LOW (ref 34.5–45)
HCT VFR BLD CALC: 31.1 % — LOW (ref 34.5–45)
HCT VFR BLD CALC: 31.1 % — LOW (ref 34.5–45)
HCT VFR BLDA CALC: 28 % — SIGNIFICANT CHANGE UP
HCT VFR BLDA CALC: 28 % — SIGNIFICANT CHANGE UP
HCT VFR BLDA CALC: 29 % — SIGNIFICANT CHANGE UP
HCT VFR BLDA CALC: 29 % — SIGNIFICANT CHANGE UP
HCT VFR BLDA CALC: 32 % — SIGNIFICANT CHANGE UP
HCT VFR BLDA CALC: 32 % — SIGNIFICANT CHANGE UP
HGB BLD-MCNC: 10.6 G/DL — LOW (ref 11.5–15.5)
HGB BLD-MCNC: 10.6 G/DL — LOW (ref 11.5–15.5)
HGB BLD-MCNC: 9.7 G/DL — LOW (ref 11.5–15.5)
HGB BLD-MCNC: 9.7 G/DL — LOW (ref 11.5–15.5)
HGB BLDA-MCNC: 10.6 G/DL — LOW (ref 11.7–16.1)
HGB BLDA-MCNC: 10.6 G/DL — LOW (ref 11.7–16.1)
HGB BLDA-MCNC: 9.2 G/DL — LOW (ref 11.7–16.1)
HGB BLDA-MCNC: 9.2 G/DL — LOW (ref 11.7–16.1)
HGB BLDA-MCNC: 9.5 G/DL — LOW (ref 11.7–16.1)
HGB BLDA-MCNC: 9.5 G/DL — LOW (ref 11.7–16.1)
IMM GRANULOCYTES NFR BLD AUTO: 0.4 % — SIGNIFICANT CHANGE UP (ref 0–0.9)
IMM GRANULOCYTES NFR BLD AUTO: 0.4 % — SIGNIFICANT CHANGE UP (ref 0–0.9)
IMM GRANULOCYTES NFR BLD AUTO: 0.9 % — SIGNIFICANT CHANGE UP (ref 0–0.9)
IMM GRANULOCYTES NFR BLD AUTO: 0.9 % — SIGNIFICANT CHANGE UP (ref 0–0.9)
INR BLD: 1.63 RATIO — HIGH (ref 0.85–1.18)
INR BLD: 1.63 RATIO — HIGH (ref 0.85–1.18)
INR BLD: 1.89 RATIO — HIGH (ref 0.85–1.18)
INR BLD: 1.89 RATIO — HIGH (ref 0.85–1.18)
INR BLD: 1.96 RATIO — HIGH (ref 0.85–1.18)
INR BLD: 1.96 RATIO — HIGH (ref 0.85–1.18)
LACTATE BLDA-MCNC: 1.1 MMOL/L — SIGNIFICANT CHANGE UP (ref 0.5–2)
LACTATE BLDA-MCNC: 1.1 MMOL/L — SIGNIFICANT CHANGE UP (ref 0.5–2)
LACTATE BLDA-MCNC: 1.3 MMOL/L — SIGNIFICANT CHANGE UP (ref 0.5–2)
LACTATE BLDA-MCNC: 1.3 MMOL/L — SIGNIFICANT CHANGE UP (ref 0.5–2)
LACTATE BLDA-MCNC: 1.7 MMOL/L — SIGNIFICANT CHANGE UP (ref 0.5–2)
LACTATE BLDA-MCNC: 1.7 MMOL/L — SIGNIFICANT CHANGE UP (ref 0.5–2)
LYMPHOCYTES # BLD AUTO: 1.24 K/UL — SIGNIFICANT CHANGE UP (ref 1–3.3)
LYMPHOCYTES # BLD AUTO: 1.24 K/UL — SIGNIFICANT CHANGE UP (ref 1–3.3)
LYMPHOCYTES # BLD AUTO: 1.3 K/UL — SIGNIFICANT CHANGE UP (ref 1–3.3)
LYMPHOCYTES # BLD AUTO: 1.3 K/UL — SIGNIFICANT CHANGE UP (ref 1–3.3)
LYMPHOCYTES # BLD AUTO: 15 % — SIGNIFICANT CHANGE UP (ref 13–44)
LYMPHOCYTES # BLD AUTO: 15 % — SIGNIFICANT CHANGE UP (ref 13–44)
LYMPHOCYTES # BLD AUTO: 17.3 % — SIGNIFICANT CHANGE UP (ref 13–44)
LYMPHOCYTES # BLD AUTO: 17.3 % — SIGNIFICANT CHANGE UP (ref 13–44)
MAGNESIUM SERPL-MCNC: 1.6 MG/DL — LOW (ref 1.8–2.6)
MAGNESIUM SERPL-MCNC: 1.6 MG/DL — LOW (ref 1.8–2.6)
MCHC RBC-ENTMCNC: 28.5 PG — SIGNIFICANT CHANGE UP (ref 27–34)
MCHC RBC-ENTMCNC: 28.5 PG — SIGNIFICANT CHANGE UP (ref 27–34)
MCHC RBC-ENTMCNC: 28.9 PG — SIGNIFICANT CHANGE UP (ref 27–34)
MCHC RBC-ENTMCNC: 28.9 PG — SIGNIFICANT CHANGE UP (ref 27–34)
MCHC RBC-ENTMCNC: 34.1 GM/DL — SIGNIFICANT CHANGE UP (ref 32–36)
MCHC RBC-ENTMCNC: 34.1 GM/DL — SIGNIFICANT CHANGE UP (ref 32–36)
MCHC RBC-ENTMCNC: 34.6 GM/DL — SIGNIFICANT CHANGE UP (ref 32–36)
MCHC RBC-ENTMCNC: 34.6 GM/DL — SIGNIFICANT CHANGE UP (ref 32–36)
MCV RBC AUTO: 83.3 FL — SIGNIFICANT CHANGE UP (ref 80–100)
MCV RBC AUTO: 83.3 FL — SIGNIFICANT CHANGE UP (ref 80–100)
MCV RBC AUTO: 83.6 FL — SIGNIFICANT CHANGE UP (ref 80–100)
MCV RBC AUTO: 83.6 FL — SIGNIFICANT CHANGE UP (ref 80–100)
METHGB MFR BLDA: 0 % — SIGNIFICANT CHANGE UP
METHGB MFR BLDA: 0 % — SIGNIFICANT CHANGE UP
METHGB MFR BLDA: 0.7 % — SIGNIFICANT CHANGE UP
METHGB MFR BLDA: 0.7 % — SIGNIFICANT CHANGE UP
METHGB MFR BLDA: 1 % — SIGNIFICANT CHANGE UP
METHGB MFR BLDA: 1 % — SIGNIFICANT CHANGE UP
MONOCYTES # BLD AUTO: 0.21 K/UL — SIGNIFICANT CHANGE UP (ref 0–0.9)
MONOCYTES # BLD AUTO: 0.21 K/UL — SIGNIFICANT CHANGE UP (ref 0–0.9)
MONOCYTES # BLD AUTO: 0.74 K/UL — SIGNIFICANT CHANGE UP (ref 0–0.9)
MONOCYTES # BLD AUTO: 0.74 K/UL — SIGNIFICANT CHANGE UP (ref 0–0.9)
MONOCYTES NFR BLD AUTO: 10.3 % — SIGNIFICANT CHANGE UP (ref 2–14)
MONOCYTES NFR BLD AUTO: 10.3 % — SIGNIFICANT CHANGE UP (ref 2–14)
MONOCYTES NFR BLD AUTO: 2.4 % — SIGNIFICANT CHANGE UP (ref 2–14)
MONOCYTES NFR BLD AUTO: 2.4 % — SIGNIFICANT CHANGE UP (ref 2–14)
NEUTROPHILS # BLD AUTO: 4.71 K/UL — SIGNIFICANT CHANGE UP (ref 1.8–7.4)
NEUTROPHILS # BLD AUTO: 4.71 K/UL — SIGNIFICANT CHANGE UP (ref 1.8–7.4)
NEUTROPHILS # BLD AUTO: 6.66 K/UL — SIGNIFICANT CHANGE UP (ref 1.8–7.4)
NEUTROPHILS # BLD AUTO: 6.66 K/UL — SIGNIFICANT CHANGE UP (ref 1.8–7.4)
NEUTROPHILS NFR BLD AUTO: 65.6 % — SIGNIFICANT CHANGE UP (ref 43–77)
NEUTROPHILS NFR BLD AUTO: 65.6 % — SIGNIFICANT CHANGE UP (ref 43–77)
NEUTROPHILS NFR BLD AUTO: 76.8 % — SIGNIFICANT CHANGE UP (ref 43–77)
NEUTROPHILS NFR BLD AUTO: 76.8 % — SIGNIFICANT CHANGE UP (ref 43–77)
OXYHGB MFR BLDA: 98 % — HIGH (ref 90–95)
PCO2 BLDA: 34 MMHG — SIGNIFICANT CHANGE UP (ref 32–45)
PCO2 BLDA: 34 MMHG — SIGNIFICANT CHANGE UP (ref 32–45)
PCO2 BLDA: 41 MMHG — SIGNIFICANT CHANGE UP (ref 32–45)
PH BLDA: 7.36 — SIGNIFICANT CHANGE UP (ref 7.35–7.45)
PH BLDA: 7.36 — SIGNIFICANT CHANGE UP (ref 7.35–7.45)
PH BLDA: 7.41 — SIGNIFICANT CHANGE UP (ref 7.35–7.45)
PH BLDA: 7.41 — SIGNIFICANT CHANGE UP (ref 7.35–7.45)
PH BLDA: 7.44 — SIGNIFICANT CHANGE UP (ref 7.35–7.45)
PH BLDA: 7.44 — SIGNIFICANT CHANGE UP (ref 7.35–7.45)
PLATELET # BLD AUTO: 283 K/UL — SIGNIFICANT CHANGE UP (ref 150–400)
PLATELET # BLD AUTO: 283 K/UL — SIGNIFICANT CHANGE UP (ref 150–400)
PLATELET # BLD AUTO: 388 K/UL — SIGNIFICANT CHANGE UP (ref 150–400)
PLATELET # BLD AUTO: 388 K/UL — SIGNIFICANT CHANGE UP (ref 150–400)
PO2 BLDA: 479 MMHG — HIGH (ref 83–108)
PO2 BLDA: 479 MMHG — HIGH (ref 83–108)
PO2 BLDA: 496 MMHG — HIGH (ref 83–108)
PO2 BLDA: 496 MMHG — HIGH (ref 83–108)
PO2 BLDA: 84 MMHG — SIGNIFICANT CHANGE UP (ref 83–108)
PO2 BLDA: 84 MMHG — SIGNIFICANT CHANGE UP (ref 83–108)
POTASSIUM BLDA-SCNC: 4.3 MMOL/L — SIGNIFICANT CHANGE UP (ref 3.5–5.1)
POTASSIUM BLDA-SCNC: 4.6 MMOL/L — SIGNIFICANT CHANGE UP (ref 3.5–5.1)
POTASSIUM BLDA-SCNC: 4.6 MMOL/L — SIGNIFICANT CHANGE UP (ref 3.5–5.1)
POTASSIUM SERPL-MCNC: 4.4 MMOL/L — SIGNIFICANT CHANGE UP (ref 3.5–5.3)
POTASSIUM SERPL-MCNC: 4.4 MMOL/L — SIGNIFICANT CHANGE UP (ref 3.5–5.3)
POTASSIUM SERPL-MCNC: 4.6 MMOL/L — SIGNIFICANT CHANGE UP (ref 3.5–5.3)
POTASSIUM SERPL-MCNC: 4.6 MMOL/L — SIGNIFICANT CHANGE UP (ref 3.5–5.3)
POTASSIUM SERPL-SCNC: 4.4 MMOL/L — SIGNIFICANT CHANGE UP (ref 3.5–5.3)
POTASSIUM SERPL-SCNC: 4.4 MMOL/L — SIGNIFICANT CHANGE UP (ref 3.5–5.3)
POTASSIUM SERPL-SCNC: 4.6 MMOL/L — SIGNIFICANT CHANGE UP (ref 3.5–5.3)
POTASSIUM SERPL-SCNC: 4.6 MMOL/L — SIGNIFICANT CHANGE UP (ref 3.5–5.3)
PROT SERPL-MCNC: 6.4 G/DL — LOW (ref 6.6–8.7)
PROT SERPL-MCNC: 6.4 G/DL — LOW (ref 6.6–8.7)
PROT SERPL-MCNC: 7.7 G/DL — SIGNIFICANT CHANGE UP (ref 6.6–8.7)
PROT SERPL-MCNC: 7.7 G/DL — SIGNIFICANT CHANGE UP (ref 6.6–8.7)
PROTHROM AB SERPL-ACNC: 17.8 SEC — HIGH (ref 9.5–13)
PROTHROM AB SERPL-ACNC: 17.8 SEC — HIGH (ref 9.5–13)
PROTHROM AB SERPL-ACNC: 20.6 SEC — HIGH (ref 9.5–13)
PROTHROM AB SERPL-ACNC: 20.6 SEC — HIGH (ref 9.5–13)
PROTHROM AB SERPL-ACNC: 21.3 SEC — HIGH (ref 9.5–13)
PROTHROM AB SERPL-ACNC: 21.3 SEC — HIGH (ref 9.5–13)
RBC # BLD: 3.36 M/UL — LOW (ref 3.8–5.2)
RBC # BLD: 3.36 M/UL — LOW (ref 3.8–5.2)
RBC # BLD: 3.72 M/UL — LOW (ref 3.8–5.2)
RBC # BLD: 3.72 M/UL — LOW (ref 3.8–5.2)
RBC # FLD: 14.4 % — SIGNIFICANT CHANGE UP (ref 10.3–14.5)
RBC # FLD: 14.4 % — SIGNIFICANT CHANGE UP (ref 10.3–14.5)
RBC # FLD: 14.5 % — SIGNIFICANT CHANGE UP (ref 10.3–14.5)
RBC # FLD: 14.5 % — SIGNIFICANT CHANGE UP (ref 10.3–14.5)
SAO2 % BLDA: 100 % — HIGH (ref 94–98)
SAO2 % BLDA: 99.9 % — HIGH (ref 94–98)
SAO2 % BLDA: 99.9 % — HIGH (ref 94–98)
SODIUM BLDA-SCNC: 131 MMOL/L — LOW (ref 136–145)
SODIUM BLDA-SCNC: 131 MMOL/L — LOW (ref 136–145)
SODIUM BLDA-SCNC: 133 MMOL/L — LOW (ref 136–145)
SODIUM BLDA-SCNC: 133 MMOL/L — LOW (ref 136–145)
SODIUM BLDA-SCNC: 134 MMOL/L — LOW (ref 136–145)
SODIUM BLDA-SCNC: 134 MMOL/L — LOW (ref 136–145)
SODIUM SERPL-SCNC: 135 MMOL/L — SIGNIFICANT CHANGE UP (ref 135–145)
SODIUM SERPL-SCNC: 135 MMOL/L — SIGNIFICANT CHANGE UP (ref 135–145)
SODIUM SERPL-SCNC: 138 MMOL/L — SIGNIFICANT CHANGE UP (ref 135–145)
SODIUM SERPL-SCNC: 138 MMOL/L — SIGNIFICANT CHANGE UP (ref 135–145)
TROPONIN T, HIGH SENSITIVITY RESULT: 63 NG/L — HIGH (ref 0–51)
TROPONIN T, HIGH SENSITIVITY RESULT: 63 NG/L — HIGH (ref 0–51)
WBC # BLD: 7.18 K/UL — SIGNIFICANT CHANGE UP (ref 3.8–10.5)
WBC # BLD: 7.18 K/UL — SIGNIFICANT CHANGE UP (ref 3.8–10.5)
WBC # BLD: 8.67 K/UL — SIGNIFICANT CHANGE UP (ref 3.8–10.5)
WBC # BLD: 8.67 K/UL — SIGNIFICANT CHANGE UP (ref 3.8–10.5)
WBC # FLD AUTO: 7.18 K/UL — SIGNIFICANT CHANGE UP (ref 3.8–10.5)
WBC # FLD AUTO: 7.18 K/UL — SIGNIFICANT CHANGE UP (ref 3.8–10.5)
WBC # FLD AUTO: 8.67 K/UL — SIGNIFICANT CHANGE UP (ref 3.8–10.5)
WBC # FLD AUTO: 8.67 K/UL — SIGNIFICANT CHANGE UP (ref 3.8–10.5)

## 2023-11-29 PROCEDURE — 71045 X-RAY EXAM CHEST 1 VIEW: CPT | Mod: 26

## 2023-11-29 PROCEDURE — MCOT1: CPT | Mod: NC

## 2023-11-29 PROCEDURE — 93010 ELECTROCARDIOGRAM REPORT: CPT | Mod: 76

## 2023-11-29 PROCEDURE — 33361 REPLACE AORTIC VALVE PERQ: CPT | Mod: Q0,62

## 2023-11-29 PROCEDURE — 93306 TTE W/DOPPLER COMPLETE: CPT | Mod: 26

## 2023-11-29 PROCEDURE — 99024 POSTOP FOLLOW-UP VISIT: CPT

## 2023-11-29 DEVICE — VLV TRANS CATH W/COMM SYS SAPIEN 3 ULTRA 23MM: Type: IMPLANTABLE DEVICE | Status: FUNCTIONAL

## 2023-11-29 DEVICE — FLOSEAL FAST PREP 10ML: Type: IMPLANTABLE DEVICE | Status: FUNCTIONAL

## 2023-11-29 RX ORDER — SODIUM CHLORIDE 9 MG/ML
1000 INJECTION INTRAMUSCULAR; INTRAVENOUS; SUBCUTANEOUS
Refills: 0 | Status: DISCONTINUED | OUTPATIENT
Start: 2023-11-29 | End: 2023-12-01

## 2023-11-29 RX ORDER — ASPIRIN/CALCIUM CARB/MAGNESIUM 324 MG
1 TABLET ORAL
Refills: 0 | DISCHARGE

## 2023-11-29 RX ORDER — SODIUM CHLORIDE 9 MG/ML
1000 INJECTION, SOLUTION INTRAVENOUS
Refills: 0 | Status: DISCONTINUED | OUTPATIENT
Start: 2023-11-29 | End: 2023-12-02

## 2023-11-29 RX ORDER — LEVOTHYROXINE SODIUM 125 MCG
75 TABLET ORAL DAILY
Refills: 0 | Status: DISCONTINUED | OUTPATIENT
Start: 2023-11-29 | End: 2023-12-02

## 2023-11-29 RX ORDER — POLYETHYLENE GLYCOL 3350 17 G/17G
17 POWDER, FOR SOLUTION ORAL DAILY
Refills: 0 | Status: DISCONTINUED | OUTPATIENT
Start: 2023-11-29 | End: 2023-12-02

## 2023-11-29 RX ORDER — CETIRIZINE HYDROCHLORIDE 10 MG/1
1 TABLET ORAL
Refills: 0 | DISCHARGE

## 2023-11-29 RX ORDER — POTASSIUM CHLORIDE 20 MEQ
10 PACKET (EA) ORAL
Refills: 0 | Status: DISCONTINUED | OUTPATIENT
Start: 2023-11-29 | End: 2023-11-29

## 2023-11-29 RX ORDER — SACUBITRIL AND VALSARTAN 24; 26 MG/1; MG/1
1 TABLET, FILM COATED ORAL
Refills: 0 | DISCHARGE

## 2023-11-29 RX ORDER — INSULIN LISPRO 100/ML
VIAL (ML) SUBCUTANEOUS
Refills: 0 | Status: DISCONTINUED | OUTPATIENT
Start: 2023-11-29 | End: 2023-12-02

## 2023-11-29 RX ORDER — SPIRONOLACTONE 25 MG/1
1 TABLET, FILM COATED ORAL
Refills: 0 | DISCHARGE

## 2023-11-29 RX ORDER — METOPROLOL TARTRATE 50 MG
1 TABLET ORAL
Refills: 0 | DISCHARGE

## 2023-11-29 RX ORDER — HYDRALAZINE HCL 50 MG
5 TABLET ORAL EVERY 6 HOURS
Refills: 0 | Status: DISCONTINUED | OUTPATIENT
Start: 2023-11-29 | End: 2023-12-02

## 2023-11-29 RX ORDER — ATORVASTATIN CALCIUM 80 MG/1
40 TABLET, FILM COATED ORAL AT BEDTIME
Refills: 0 | Status: DISCONTINUED | OUTPATIENT
Start: 2023-11-29 | End: 2023-12-02

## 2023-11-29 RX ORDER — GABAPENTIN 400 MG/1
0 CAPSULE ORAL
Refills: 0 | DISCHARGE

## 2023-11-29 RX ORDER — DEXTROSE 50 % IN WATER 50 %
25 SYRINGE (ML) INTRAVENOUS ONCE
Refills: 0 | Status: DISCONTINUED | OUTPATIENT
Start: 2023-11-29 | End: 2023-12-02

## 2023-11-29 RX ORDER — PANTOPRAZOLE SODIUM 20 MG/1
40 TABLET, DELAYED RELEASE ORAL ONCE
Refills: 0 | Status: COMPLETED | OUTPATIENT
Start: 2023-11-29 | End: 2023-11-29

## 2023-11-29 RX ORDER — FERROUS SULFATE 325(65) MG
325 TABLET ORAL DAILY
Refills: 0 | Status: DISCONTINUED | OUTPATIENT
Start: 2023-11-29 | End: 2023-12-02

## 2023-11-29 RX ORDER — ASPIRIN/CALCIUM CARB/MAGNESIUM 324 MG
81 TABLET ORAL DAILY
Refills: 0 | Status: DISCONTINUED | OUTPATIENT
Start: 2023-11-30 | End: 2023-12-02

## 2023-11-29 RX ORDER — DEXTROSE 50 % IN WATER 50 %
12.5 SYRINGE (ML) INTRAVENOUS ONCE
Refills: 0 | Status: DISCONTINUED | OUTPATIENT
Start: 2023-11-29 | End: 2023-12-02

## 2023-11-29 RX ORDER — PANTOPRAZOLE SODIUM 20 MG/1
40 TABLET, DELAYED RELEASE ORAL DAILY
Refills: 0 | Status: DISCONTINUED | OUTPATIENT
Start: 2023-11-30 | End: 2023-12-02

## 2023-11-29 RX ORDER — LORATADINE 10 MG/1
10 TABLET ORAL DAILY
Refills: 0 | Status: DISCONTINUED | OUTPATIENT
Start: 2023-11-29 | End: 2023-12-02

## 2023-11-29 RX ORDER — ESOMEPRAZOLE MAGNESIUM 40 MG/1
1 CAPSULE, DELAYED RELEASE ORAL
Qty: 0 | Refills: 0 | DISCHARGE

## 2023-11-29 RX ORDER — GABAPENTIN 400 MG/1
300 CAPSULE ORAL
Refills: 0 | Status: DISCONTINUED | OUTPATIENT
Start: 2023-11-29 | End: 2023-12-02

## 2023-11-29 RX ORDER — LEVOTHYROXINE SODIUM 125 MCG
1 TABLET ORAL
Qty: 0 | Refills: 0 | DISCHARGE

## 2023-11-29 RX ORDER — ATORVASTATIN CALCIUM 80 MG/1
1 TABLET, FILM COATED ORAL
Refills: 0 | DISCHARGE

## 2023-11-29 RX ORDER — TRAMADOL HYDROCHLORIDE 50 MG/1
1 TABLET ORAL
Refills: 0 | DISCHARGE

## 2023-11-29 RX ORDER — GLUCAGON INJECTION, SOLUTION 0.5 MG/.1ML
1 INJECTION, SOLUTION SUBCUTANEOUS ONCE
Refills: 0 | Status: DISCONTINUED | OUTPATIENT
Start: 2023-11-29 | End: 2023-12-02

## 2023-11-29 RX ORDER — FERROUS SULFATE 325(65) MG
1 TABLET ORAL
Refills: 0 | DISCHARGE

## 2023-11-29 RX ORDER — CEFUROXIME AXETIL 250 MG
1500 TABLET ORAL EVERY 8 HOURS
Refills: 0 | Status: COMPLETED | OUTPATIENT
Start: 2023-11-30 | End: 2023-11-30

## 2023-11-29 RX ORDER — DEXTROSE 50 % IN WATER 50 %
15 SYRINGE (ML) INTRAVENOUS ONCE
Refills: 0 | Status: DISCONTINUED | OUTPATIENT
Start: 2023-11-29 | End: 2023-12-02

## 2023-11-29 RX ADMIN — SODIUM CHLORIDE 3 MILLILITER(S): 9 INJECTION INTRAMUSCULAR; INTRAVENOUS; SUBCUTANEOUS at 21:50

## 2023-11-29 RX ADMIN — PANTOPRAZOLE SODIUM 40 MILLIGRAM(S): 20 TABLET, DELAYED RELEASE ORAL at 18:26

## 2023-11-29 RX ADMIN — ATORVASTATIN CALCIUM 40 MILLIGRAM(S): 80 TABLET, FILM COATED ORAL at 21:51

## 2023-11-29 RX ADMIN — GABAPENTIN 300 MILLIGRAM(S): 400 CAPSULE ORAL at 21:50

## 2023-11-29 NOTE — BRIEF OPERATIVE NOTE - OPERATION/FINDINGS
Beginning of case LVEDP 14mmHg, Post Deployment Trace PVL (mean gradient 4 mmHg), No Conduction or Rhythm Disturbances, Extubated in the OR

## 2023-11-29 NOTE — BRIEF OPERATIVE NOTE - NSICDXBRIEFPROCEDURE_GEN_ALL_CORE_FT
PROCEDURES:  TAVR, percutaneous 29-Nov-2023 17:19:36 Percutaneous Transfemoral TAVR via Right Common Femoral Artery (23mm Trevor Resilia) (NCT# [id number]) (STS/[facility] TVT Registry Patient ID# [id number]) Arun Perez

## 2023-11-29 NOTE — BRIEF OPERATIVE NOTE - COMMENTS
[employer] Representative: Valentino Maloney (clinical support)  Invasive Lines: Left Radial Arterial Line, Left Femoral Venous Sheath  IV Medication Infusions: Levophed Used

## 2023-11-29 NOTE — BRIEF OPERATIVE NOTE - NSICDXBRIEFPREOP_GEN_ALL_CORE_FT
PRE-OP DIAGNOSIS:  Severe aortic stenosis 29-Nov-2023 17:17:30  Arun Perez  Chronic combined systolic and diastolic heart failure, NYHA class 2 29-Nov-2023 17:18:10  Arun Perez  Right bundle branch block 29-Nov-2023 17:18:30  Arun Perez

## 2023-11-29 NOTE — CHART NOTE - NSCHARTNOTEFT_GEN_A_CORE
Search Terms: Tawana Manzanares, 1945Search Date: 11/10/2023 14:25:39 PM  The Drug Utilization Report below displays all of the controlled substance prescriptions, if any, that your patient has filled in the last twelve months. The information displayed on this report is compiled from pharmacy submissions to the Department, and accurately reflects the information as submitted by the pharmacies.    This report was requested by: Lona Jose | Reference #: [id number]    You have not added a HEVER number. Keeping your HEVER number(s) up to date on the My HEVER # tab will enable the separation of your prescriptions from others in the search results.    Practitioner Count: 1  Pharmacy Count: 1  Current Opioid Prescriptions: 1  Current Benzodiazepine Prescriptions: 0  Current Stimulant Prescriptions: 0      Patient Demographic Information (PDI)       PDI	First Name	Last Name	Birth Date	Gender	Street Address	[facility] Code  A	Tawana Manzanares	1945	Female	[address]	[id number]    Prescription Information      PDI Filter:    PDI	Current Rx	Drug Type	Rx Written	Rx Dispensed	Drug	Quantity	Days Supply	Prescriber Name	Prescriber HEVER #	Payment Method	Dispenser  A	Y	O	11/01/2023	11/05/2023	tramadol hcl 100 mg tablet	120	30	Margarito Stevens MD	[id number]	Medicare	[id number]  A	N	O	09/11/2023	09/16/2023	tramadol hcl 100 mg tablet	120	30	Margarito Stevens MD	[id number]	Medicare	[id number]  A	N		05/01/2023	07/04/2023	zolpidem tart er 12.5 mg tab	30	30	Margarito Stevens MD	[id number]	Medicare	[id number]  A	N	O	06/26/2023	07/04/2023	tramadol hcl 100 mg tablet	120	30	Margarito Stevens MD	[id number]	[id number]  A	N	B	06/23/2023	06/24/2023	alprazolam 0.5 mg tablet	60	30	Margarito Stevens MD	[id number]	[id number]  A	N		05/01/2023	05/30/2023	zolpidem tart er 12.5 mg tab	30	30	Margarito Stevens MD	[id number]	Medicare	[id number]  A	N		05/01/2023	05/01/2023	zolpidem tart er 12.5 mg tab	30	30	Margarito Stevens MD	[id number]	Medicare	[id number]  A	N	O	03/30/2023	04/25/2023	tramadol hcl 100 mg tablet	120	30	Margarito Stevens MD	[id number]	Insurance	[id number]  A	N		10/05/2022	04/01/2023	zolpidem tart er 12.5 mg tab	30	30	Margarito Stevens MD	[id number]	Medicare	[id number]  A	N		10/05/2022	02/28/2023	zolpidem tart er 12.5 mg tab	30	30	Margarito Stevens MD	[id number]	Medicare	[id number]  A	N	O	02/13/2023	02/17/2023	tramadol hcl 100 mg tablet	120	30	Margarito Stevens MD	[id number]	Medicare	[id number]  A	N		12/02/2022	01/31/2023	zolpidem tart er 12.5 mg tab	30	30	Margarito Stevens MD	[id number]	Medicare	[id number]  A	N		12/02/2022	01/02/2023	zolpidem tart er 12.5 mg tab	30	30	Margarito Stevens MD	[id number]	Insurance	[id number]  A	N	O	12/05/2022	12/12/2022	tramadol hcl 100 mg tablet	120	30	Margarito Stevens MD	[id number]	Insurance	[id number]  A	N		12/02/2022	12/03/2022	zolpidem tart er 12.5 mg tab	30	30	Margarito Stevens MD	[id number]	Medicare	[id number]
Commercial 23mm [employer] Resilia Percutaneous Transfemoral TAVR via right Common Femoral Artery.  NCT# [id number], STS/ACC TVT Registry Patient ID# [id number].

## 2023-11-29 NOTE — BRIEF OPERATIVE NOTE - NSICDXBRIEFPOSTOP_GEN_ALL_CORE_FT
POST-OP DIAGNOSIS:  Severe aortic stenosis 29-Nov-2023 17:18:47  Arun Perez  Chronic combined systolic and diastolic heart failure, NYHA class 2 29-Nov-2023 17:19:03  Arun Perez  Right bundle branch block 29-Nov-2023 17:19:22  Arun Perez

## 2023-11-30 ENCOUNTER — TRANSCRIPTION ENCOUNTER (OUTPATIENT)
Age: 78
End: 2023-11-30

## 2023-11-30 DIAGNOSIS — N17.9 ACUTE KIDNEY FAILURE, UNSPECIFIED: ICD-10-CM

## 2023-11-30 PROBLEM — Z92.29 PERSONAL HISTORY OF OTHER DRUG THERAPY: Chronic | Status: ACTIVE | Noted: 2023-11-10

## 2023-11-30 PROBLEM — Z86.39 PERSONAL HISTORY OF OTHER ENDOCRINE, NUTRITIONAL AND METABOLIC DISEASE: Chronic | Status: ACTIVE | Noted: 2023-11-10

## 2023-11-30 LAB
ANION GAP SERPL CALC-SCNC: 14 MMOL/L — SIGNIFICANT CHANGE UP (ref 5–17)
ANION GAP SERPL CALC-SCNC: 14 MMOL/L — SIGNIFICANT CHANGE UP (ref 5–17)
ANION GAP SERPL CALC-SCNC: 16 MMOL/L — SIGNIFICANT CHANGE UP (ref 5–17)
ANION GAP SERPL CALC-SCNC: 16 MMOL/L — SIGNIFICANT CHANGE UP (ref 5–17)
APTT BLD: 44.3 SEC — HIGH (ref 24.5–35.6)
APTT BLD: 44.3 SEC — HIGH (ref 24.5–35.6)
BUN SERPL-MCNC: 75.8 MG/DL — HIGH (ref 8–20)
BUN SERPL-MCNC: 75.8 MG/DL — HIGH (ref 8–20)
BUN SERPL-MCNC: 80.6 MG/DL — HIGH (ref 8–20)
BUN SERPL-MCNC: 80.6 MG/DL — HIGH (ref 8–20)
CALCIUM SERPL-MCNC: 8 MG/DL — LOW (ref 8.4–10.5)
CALCIUM SERPL-MCNC: 8 MG/DL — LOW (ref 8.4–10.5)
CALCIUM SERPL-MCNC: 8.6 MG/DL — SIGNIFICANT CHANGE UP (ref 8.4–10.5)
CALCIUM SERPL-MCNC: 8.6 MG/DL — SIGNIFICANT CHANGE UP (ref 8.4–10.5)
CHLORIDE SERPL-SCNC: 96 MMOL/L — SIGNIFICANT CHANGE UP (ref 96–108)
CK SERPL-CCNC: 65 U/L — SIGNIFICANT CHANGE UP (ref 25–170)
CK SERPL-CCNC: 65 U/L — SIGNIFICANT CHANGE UP (ref 25–170)
CO2 SERPL-SCNC: 23 MMOL/L — SIGNIFICANT CHANGE UP (ref 22–29)
CO2 SERPL-SCNC: 23 MMOL/L — SIGNIFICANT CHANGE UP (ref 22–29)
CO2 SERPL-SCNC: 24 MMOL/L — SIGNIFICANT CHANGE UP (ref 22–29)
CO2 SERPL-SCNC: 24 MMOL/L — SIGNIFICANT CHANGE UP (ref 22–29)
CREAT SERPL-MCNC: 4.02 MG/DL — HIGH (ref 0.5–1.3)
CREAT SERPL-MCNC: 4.02 MG/DL — HIGH (ref 0.5–1.3)
CREAT SERPL-MCNC: 4.21 MG/DL — HIGH (ref 0.5–1.3)
CREAT SERPL-MCNC: 4.21 MG/DL — HIGH (ref 0.5–1.3)
EGFR: 10 ML/MIN/1.73M2 — LOW
EGFR: 10 ML/MIN/1.73M2 — LOW
EGFR: 11 ML/MIN/1.73M2 — LOW
EGFR: 11 ML/MIN/1.73M2 — LOW
GLUCOSE BLDC GLUCOMTR-MCNC: 106 MG/DL — HIGH (ref 70–99)
GLUCOSE BLDC GLUCOMTR-MCNC: 106 MG/DL — HIGH (ref 70–99)
GLUCOSE BLDC GLUCOMTR-MCNC: 77 MG/DL — SIGNIFICANT CHANGE UP (ref 70–99)
GLUCOSE BLDC GLUCOMTR-MCNC: 77 MG/DL — SIGNIFICANT CHANGE UP (ref 70–99)
GLUCOSE BLDC GLUCOMTR-MCNC: 80 MG/DL — SIGNIFICANT CHANGE UP (ref 70–99)
GLUCOSE BLDC GLUCOMTR-MCNC: 80 MG/DL — SIGNIFICANT CHANGE UP (ref 70–99)
GLUCOSE BLDC GLUCOMTR-MCNC: 91 MG/DL — SIGNIFICANT CHANGE UP (ref 70–99)
GLUCOSE BLDC GLUCOMTR-MCNC: 91 MG/DL — SIGNIFICANT CHANGE UP (ref 70–99)
GLUCOSE BLDC GLUCOMTR-MCNC: 99 MG/DL — SIGNIFICANT CHANGE UP (ref 70–99)
GLUCOSE BLDC GLUCOMTR-MCNC: 99 MG/DL — SIGNIFICANT CHANGE UP (ref 70–99)
GLUCOSE SERPL-MCNC: 114 MG/DL — HIGH (ref 70–99)
GLUCOSE SERPL-MCNC: 114 MG/DL — HIGH (ref 70–99)
GLUCOSE SERPL-MCNC: 75 MG/DL — SIGNIFICANT CHANGE UP (ref 70–99)
GLUCOSE SERPL-MCNC: 75 MG/DL — SIGNIFICANT CHANGE UP (ref 70–99)
HCT VFR BLD CALC: 27.8 % — LOW (ref 34.5–45)
HCT VFR BLD CALC: 27.8 % — LOW (ref 34.5–45)
HGB BLD-MCNC: 9.5 G/DL — LOW (ref 11.5–15.5)
HGB BLD-MCNC: 9.5 G/DL — LOW (ref 11.5–15.5)
INR BLD: 1.49 RATIO — HIGH (ref 0.85–1.18)
INR BLD: 1.49 RATIO — HIGH (ref 0.85–1.18)
MAGNESIUM SERPL-MCNC: 1.7 MG/DL — SIGNIFICANT CHANGE UP (ref 1.6–2.6)
MAGNESIUM SERPL-MCNC: 1.7 MG/DL — SIGNIFICANT CHANGE UP (ref 1.6–2.6)
MAGNESIUM SERPL-MCNC: 2.2 MG/DL — SIGNIFICANT CHANGE UP (ref 1.6–2.6)
MAGNESIUM SERPL-MCNC: 2.2 MG/DL — SIGNIFICANT CHANGE UP (ref 1.6–2.6)
MCHC RBC-ENTMCNC: 28.7 PG — SIGNIFICANT CHANGE UP (ref 27–34)
MCHC RBC-ENTMCNC: 28.7 PG — SIGNIFICANT CHANGE UP (ref 27–34)
MCHC RBC-ENTMCNC: 34.2 GM/DL — SIGNIFICANT CHANGE UP (ref 32–36)
MCHC RBC-ENTMCNC: 34.2 GM/DL — SIGNIFICANT CHANGE UP (ref 32–36)
MCV RBC AUTO: 84 FL — SIGNIFICANT CHANGE UP (ref 80–100)
MCV RBC AUTO: 84 FL — SIGNIFICANT CHANGE UP (ref 80–100)
PLATELET # BLD AUTO: 292 K/UL — SIGNIFICANT CHANGE UP (ref 150–400)
PLATELET # BLD AUTO: 292 K/UL — SIGNIFICANT CHANGE UP (ref 150–400)
POTASSIUM SERPL-MCNC: 4.2 MMOL/L — SIGNIFICANT CHANGE UP (ref 3.5–5.3)
POTASSIUM SERPL-MCNC: 4.2 MMOL/L — SIGNIFICANT CHANGE UP (ref 3.5–5.3)
POTASSIUM SERPL-MCNC: 4.8 MMOL/L — SIGNIFICANT CHANGE UP (ref 3.5–5.3)
POTASSIUM SERPL-MCNC: 4.8 MMOL/L — SIGNIFICANT CHANGE UP (ref 3.5–5.3)
POTASSIUM SERPL-SCNC: 4.2 MMOL/L — SIGNIFICANT CHANGE UP (ref 3.5–5.3)
POTASSIUM SERPL-SCNC: 4.2 MMOL/L — SIGNIFICANT CHANGE UP (ref 3.5–5.3)
POTASSIUM SERPL-SCNC: 4.8 MMOL/L — SIGNIFICANT CHANGE UP (ref 3.5–5.3)
POTASSIUM SERPL-SCNC: 4.8 MMOL/L — SIGNIFICANT CHANGE UP (ref 3.5–5.3)
PROTHROM AB SERPL-ACNC: 16.4 SEC — HIGH (ref 9.5–13)
PROTHROM AB SERPL-ACNC: 16.4 SEC — HIGH (ref 9.5–13)
RBC # BLD: 3.31 M/UL — LOW (ref 3.8–5.2)
RBC # BLD: 3.31 M/UL — LOW (ref 3.8–5.2)
RBC # FLD: 14.6 % — HIGH (ref 10.3–14.5)
RBC # FLD: 14.6 % — HIGH (ref 10.3–14.5)
SODIUM SERPL-SCNC: 134 MMOL/L — LOW (ref 135–145)
SODIUM SERPL-SCNC: 134 MMOL/L — LOW (ref 135–145)
SODIUM SERPL-SCNC: 135 MMOL/L — SIGNIFICANT CHANGE UP (ref 135–145)
SODIUM SERPL-SCNC: 135 MMOL/L — SIGNIFICANT CHANGE UP (ref 135–145)
TROPONIN T, HIGH SENSITIVITY RESULT: 81 NG/L — HIGH (ref 0–51)
TROPONIN T, HIGH SENSITIVITY RESULT: 81 NG/L — HIGH (ref 0–51)
WBC # BLD: 9.39 K/UL — SIGNIFICANT CHANGE UP (ref 3.8–10.5)
WBC # BLD: 9.39 K/UL — SIGNIFICANT CHANGE UP (ref 3.8–10.5)
WBC # FLD AUTO: 9.39 K/UL — SIGNIFICANT CHANGE UP (ref 3.8–10.5)
WBC # FLD AUTO: 9.39 K/UL — SIGNIFICANT CHANGE UP (ref 3.8–10.5)

## 2023-11-30 PROCEDURE — 71045 X-RAY EXAM CHEST 1 VIEW: CPT | Mod: 26

## 2023-11-30 PROCEDURE — 99291 CRITICAL CARE FIRST HOUR: CPT

## 2023-11-30 PROCEDURE — 99231 SBSQ HOSP IP/OBS SF/LOW 25: CPT

## 2023-11-30 PROCEDURE — 93010 ELECTROCARDIOGRAM REPORT: CPT

## 2023-11-30 RX ORDER — SODIUM CHLORIDE 9 MG/ML
500 INJECTION INTRAMUSCULAR; INTRAVENOUS; SUBCUTANEOUS
Refills: 0 | Status: DISCONTINUED | OUTPATIENT
Start: 2023-11-30 | End: 2023-12-01

## 2023-11-30 RX ORDER — MAGNESIUM SULFATE 500 MG/ML
2 VIAL (ML) INJECTION ONCE
Refills: 0 | Status: COMPLETED | OUTPATIENT
Start: 2023-11-30 | End: 2023-11-30

## 2023-11-30 RX ADMIN — POLYETHYLENE GLYCOL 3350 17 GRAM(S): 17 POWDER, FOR SOLUTION ORAL at 12:15

## 2023-11-30 RX ADMIN — Medication 100 MILLIGRAM(S): at 00:10

## 2023-11-30 RX ADMIN — GABAPENTIN 300 MILLIGRAM(S): 400 CAPSULE ORAL at 05:20

## 2023-11-30 RX ADMIN — SODIUM CHLORIDE 75 MILLILITER(S): 9 INJECTION INTRAMUSCULAR; INTRAVENOUS; SUBCUTANEOUS at 12:14

## 2023-11-30 RX ADMIN — ATORVASTATIN CALCIUM 40 MILLIGRAM(S): 80 TABLET, FILM COATED ORAL at 22:23

## 2023-11-30 RX ADMIN — Medication 81 MILLIGRAM(S): at 12:15

## 2023-11-30 RX ADMIN — SODIUM CHLORIDE 3 MILLILITER(S): 9 INJECTION INTRAMUSCULAR; INTRAVENOUS; SUBCUTANEOUS at 05:19

## 2023-11-30 RX ADMIN — PANTOPRAZOLE SODIUM 40 MILLIGRAM(S): 20 TABLET, DELAYED RELEASE ORAL at 12:15

## 2023-11-30 RX ADMIN — Medication 25 GRAM(S): at 03:41

## 2023-11-30 RX ADMIN — Medication 75 MICROGRAM(S): at 05:20

## 2023-11-30 RX ADMIN — LORATADINE 10 MILLIGRAM(S): 10 TABLET ORAL at 12:15

## 2023-11-30 RX ADMIN — SODIUM CHLORIDE 3 MILLILITER(S): 9 INJECTION INTRAMUSCULAR; INTRAVENOUS; SUBCUTANEOUS at 14:00

## 2023-11-30 RX ADMIN — GABAPENTIN 300 MILLIGRAM(S): 400 CAPSULE ORAL at 17:03

## 2023-11-30 RX ADMIN — SODIUM CHLORIDE 3 MILLILITER(S): 9 INJECTION INTRAMUSCULAR; INTRAVENOUS; SUBCUTANEOUS at 22:19

## 2023-11-30 RX ADMIN — Medication 325 MILLIGRAM(S): at 12:15

## 2023-11-30 RX ADMIN — SODIUM CHLORIDE 10 MILLILITER(S): 9 INJECTION INTRAMUSCULAR; INTRAVENOUS; SUBCUTANEOUS at 12:14

## 2023-11-30 RX ADMIN — Medication 100 MILLIGRAM(S): at 08:01

## 2023-11-30 NOTE — DISCHARGE NOTE PROVIDER - HOSPITAL COURSE
78F, pmhx severe AS, Mod MR/TR, CAD/ICM (EF ~ 25% with mod RV Dysfunction in 6/2023), PCI to p&mLAD (9/1/23 with improvement in EF ~ 50% with NL RV 10/27/23 TTE), Chronic Combined Systolic & Diastolic CHF, HTN, HLD, RBBB, DVT/PE (on Eliquis), PAD (Infrarenal Stenosis > 50%), Former Smoker, Left Breast Ca s/p Lumpectomy / Radiation / Hormone (2006), Covid Infection, Stage 4 CKD, Left Renal Cyst, DM (A1C 5.5), Cholecystectomy, Diverticulosis, Dilated Appendix (? Mucocele-pt aware), Thyroid Nodules, Hypothyroid, T12 Compression Fx (wears TLSO brace), Lumbar Laminectomy with CBP, Skin Ca (Melanoma & Basal Cell) now s/p percutaneous TF-TAVR via RCFA (cathy) 11/29/23 with Dr. Shelton. Postop course notable for RAMÍREZ, mendez placed, renal consulted, improved with hydration. Pt hemodynamically stable and ambulating well. Pt stable for discharge home as per Dr. Shelton.   Patient will be discharged on an MCOT monitor for 30 days to evaluate for potential rhythm disturbances due to TAVR. 78F, pmhx severe AS, Mod MR/TR, CAD/ICM (EF ~ 25% with mod RV Dysfunction in 6/2023), PCI to p&mLAD (9/1/23 with improvement in EF ~ 50% with NL RV 10/27/23 TTE), Chronic Combined Systolic & Diastolic CHF, HTN, HLD, RBBB, DVT/PE (on Eliquis), PAD (Infrarenal Stenosis > 50%), Former Smoker, Left Breast Ca s/p Lumpectomy / Radiation / Hormone (2006), Covid Infection, Stage 4 CKD, Left Renal Cyst, DM (A1C 5.5), Cholecystectomy, Diverticulosis, Dilated Appendix (? Mucocele-pt aware), Thyroid Nodules, Hypothyroid, T12 Compression Fx (wears TLSO brace), Lumbar Laminectomy with CBP, Skin Ca (Melanoma & Basal Cell) now s/p percutaneous TF-TAVR via RCFA (cathy) 11/29/23 with Dr. Shelton. Postop course notable for RAMÍREZ, mendez placed, renal consulted, improved with hydration. As of 12/2 Creatinine is back to baseline levels. Pt hemodynamically stable and ambulating well. Pt stable for discharge home as per Dr. Shelton.   Patient will be discharged on an MCOT monitor for 30 days to evaluate for potential rhythm disturbances due to TAVR.      < from: TTE Echo Complete w/ Contrast w/ Doppler (11.29.23 @ 19:02) >    Summary:   1. Left ventricular ejection fraction, by visual estimation, is 60 to   65%.   2. Normal global left ventricular systolic function.   3. Spectral Doppler shows pseudonormal pattern of left ventricular   myocardial filling (Grade II diastolic dysfunction).   4. Mild mitral valve regurgitation.   5. A 23mm Davey Cathy TAVR is visualized in the aortic position. Low   seated. Appears deep into LVOT.   6. AT=73.94msec.   7. Estimated pulmonary artery systolic pressure is 43.2 mmHg assuming a   right atrial pressure of 3 mmHg, which is consistent with mild pulmonary   hypertension.   8. The Dimesionless Index value is .74.   9. Results d/w JOO Hicks.    MD Hemal Electronically signed on 11/30/2023 at 3:11:45 PM    < end of copied text >     78F, pmhx severe AS, Mod MR/TR, CAD/ICM (EF ~ 25% with mod RV Dysfunction in 6/2023), PCI to p&mLAD (9/1/23 with improvement in EF ~ 50% with NL RV 10/27/23 TTE), Chronic Combined Systolic & Diastolic CHF, HTN, HLD, RBBB, DVT/PE (on Eliquis), PAD (Infrarenal Stenosis > 50%), Former Smoker, Left Breast Ca s/p Lumpectomy / Radiation / Hormone (2006), Covid Infection, Stage 4 CKD, Left Renal Cyst, DM (A1C 5.5), Cholecystectomy, Diverticulosis, Dilated Appendix (? Mucocele-pt aware), Thyroid Nodules, Hypothyroid, T12 Compression Fx (wears TLSO brace), Lumbar Laminectomy with CBP, Skin Ca (Melanoma & Basal Cell) now s/p percutaneous TF-TAVR via RCFA (cathy) 11/29/23 with Dr. Shelton. Postop course notable for RAMÍREZ, mendez placed, renal consulted, improved with hydration. As of 12/2 Creatinine is back to baseline levels. Pt hemodynamically stable and ambulating well. Pt stable for discharge home as per Dr. Shelton.   Patient will be discharged on an MCOT monitor for 30 days to evaluate for potential rhythm disturbances due to TAVR.    < from: TTE Echo Complete w/ Contrast w/ Doppler (11.29.23 @ 19:02) >    Summary:   1. Left ventricular ejection fraction, by visual estimation, is 60 to   65%.   2. Normal global left ventricular systolic function.   3. Spectral Doppler shows pseudonormal pattern of left ventricular   myocardial filling (Grade II diastolic dysfunction).   4. Mild mitral valve regurgitation.   5. A 23mm Davey Cathy TAVR is visualized in the aortic position. Low   seated. Appears deep into LVOT.   6. AT=73.94msec.   7. Estimated pulmonary artery systolic pressure is 43.2 mmHg assuming a   right atrial pressure of 3 mmHg, which is consistent with mild pulmonary   hypertension.   8. The Dimesionless Index value is .74.   9. Results d/w JOO Hicks.    MD Hemal Electronically signed on 11/30/2023 at 3:11:45 PM    < end of copied text >

## 2023-11-30 NOTE — DISCHARGE NOTE PROVIDER - CARE PROVIDER_API CALL
Nikita Metz, James Nicholson  Thoracic and Cardiac Surgery  [address] [place] [address]  Phone: [phone]  Fax: [phone]  Follow Up Time:     Jose Arshad  Nephrology  [address] [place] [address]  Phone: [phone]  Fax: [phone]  Follow Up Time:     Margarito Stevens  Cardiovascular Disease  [address]  [place] [address]  Phone: [phone]  Fax: [phone]  Follow Up Time:

## 2023-11-30 NOTE — DISCHARGE NOTE PROVIDER - PROVIDER TOKENS
PROVIDER:[TOKEN:[[id number]]],PROVIDER:[TOKEN:[[id number]]],PROVIDER:[TOKEN:[[id number]]]

## 2023-11-30 NOTE — DISCHARGE NOTE PROVIDER - NSDCHHNEEDSERVICE_GEN_ALL_CORE
Medication teaching and assessment/Other, specify... Medication teaching and assessment/Observation and assessment/Wound care and assessment/Other, specify...

## 2023-11-30 NOTE — DISCHARGE NOTE PROVIDER - NSDCMRMEDTOKEN_GEN_ALL_CORE_FT
Subjective   Patient ID: Priyanka is a 16 year old female.    Chief Complaint   Patient presents with   • Congestion   • URI     Patient is a 16 y.o. female presents to the clinic for URI. Patient c/o nasal congestion, runny nose, cough. Patient denies any fever, chills, body aches, sore throat, headache. Symptoms started 1/5 and are improving . She did a drive thru rapid covid test and it was negative on 1/7. Patient is taking Dayquil and Nyquil. Denies taking any antibiotics in the past 30 days. Denies any recent travel. Denies any exposure to covid. Denies being vaccinated against covid.           History reviewed. No pertinent past medical history.    MEDICATIONS:  Current Outpatient Medications   Medication Sig   • loratadine (Claritin) 10 MG tablet Take 1 tablet by mouth daily for 10 days.   • fluticasone (Flonase) 50 MCG/ACT nasal spray Spray 1 spray in each nostril 2 times daily for 10 days.   • benzonatate (TESSALON PERLES) 100 MG capsule Take 1 capsule by mouth 3 times daily as needed for Cough.     No current facility-administered medications for this visit.       ALLERGIES:  ALLERGIES:  No Known Allergies    PAST SURGICAL HISTORY:  History reviewed. No pertinent surgical history.    FAMILY HISTORY:  History reviewed. No pertinent family history.    SOCIAL HISTORY:  Social History     Tobacco Use   • Smoking status: Never Smoker   • Smokeless tobacco: Never Used   Vaping Use   • Vaping Use: never used   Substance Use Topics   • Alcohol use: Not on file   • Drug use: Not on file         Patient's medications, allergies, past medical, surgical, and social history  were reviewed and updated as appropriate.    Review of Systems   Constitutional: Negative for appetite change, chills, fatigue and fever.   HENT: Positive for congestion and rhinorrhea. Negative for ear discharge, ear pain, sinus pressure, sinus pain and sore throat.    Respiratory: Positive for cough. Negative for chest tightness, shortness of  breath and wheezing.    Gastrointestinal: Negative for diarrhea, nausea and vomiting.   Musculoskeletal: Negative for myalgias.   Skin: Negative for rash.   Neurological: Negative for dizziness and headaches.       Objective   Physical Exam  Vitals and nursing note reviewed.   Constitutional:       Appearance: Normal appearance.      Interventions: Face mask in place.   HENT:      Head: Normocephalic and atraumatic.      Right Ear: Tympanic membrane, ear canal and external ear normal.      Left Ear: Tympanic membrane, ear canal and external ear normal.      Nose: Congestion present. No rhinorrhea.      Right Sinus: No maxillary sinus tenderness or frontal sinus tenderness.      Left Sinus: No maxillary sinus tenderness or frontal sinus tenderness.      Mouth/Throat:      Lips: Pink.      Mouth: Mucous membranes are moist.      Pharynx: Oropharynx is clear. Uvula midline. Posterior oropharyngeal erythema present. No pharyngeal swelling, oropharyngeal exudate or uvula swelling.      Tonsils: No tonsillar exudate or tonsillar abscesses. 1+ on the right. 1+ on the left.   Cardiovascular:      Rate and Rhythm: Normal rate and regular rhythm.      Heart sounds: Normal heart sounds.   Pulmonary:      Effort: Pulmonary effort is normal.      Breath sounds: Normal breath sounds and air entry.   Lymphadenopathy:      Head:      Right side of head: No submental, submandibular, tonsillar, preauricular, posterior auricular or occipital adenopathy.      Left side of head: No submental, submandibular, tonsillar, preauricular, posterior auricular or occipital adenopathy.      Cervical: No cervical adenopathy.   Skin:     General: Skin is warm.   Neurological:      Mental Status: She is alert and oriented to person, place, and time.   Psychiatric:         Attention and Perception: Attention normal.         Mood and Affect: Mood normal.         Speech: Speech normal.         Behavior: Behavior normal. Behavior is cooperative.        Visit Vitals  /70 (BP Location: RUE - Right upper extremity, Patient Position: Sitting, Cuff Size: Regular)   Pulse (!) 101   Temp 97.5 °F (36.4 °C) (Temporal)   Resp 18   LMP 01/09/2022   SpO2 99%       Assessment   Problem List Items Addressed This Visit     None      Visit Diagnoses     Viral URI with cough    -  Primary    Relevant Medications    loratadine (Claritin) 10 MG tablet    fluticasone (Flonase) 50 MCG/ACT nasal spray    benzonatate (TESSALON PERLES) 100 MG capsule    Other Relevant Orders    2019 NOVEL CORONAVIRUS (SARS-COV-2)          This is a 16 year old year-old female who presents with viral URI  Will call back in 24-48 hours with covid pcr test results  Pt informed they have a viral URI, which can take 5-7 days to recover from  Encouraged to use OTC Flonase nasal spray to help relieve nasal congestion/runny nose   Encouraged to take OTC symptomatic treatment such as Claritin or Zyrtec for nasal congestion/runny nose  Encouraged to take Mucinex and Tessalon perles for the cough  Encouraged to get adequate rest and drink adequate fluids  Advised to take OTC Tylenol as needed for pain and comfort.   Encouraged to use a humidifier   Advised the patient to go to the [facility] if they develop any severe headache, change in vision, periorbital rash/swelling, swollen lymph nodes, difficulty swallowing/breathing, chest pain or worsening of symptoms.   Continue covid-19 precautionary measures as recommended by the CDC  Pt voiced understanding and agreed with the plan.     Instructions provided as documented in the AVS.  Time spent with patient was 20-29 minutes for history/physical exam, reviewed patient records, ordering medications, ordering tests/cultures, providing education and documenting the note.     Thank you for visiting [facility].  Please follow up with your PCP as needed.   apixaban 5 mg oral tablet: 1 tab(s) orally 2 times a day  aspirin 81 mg oral tablet: 1 tab(s) orally once a day  atorvastatin 40 mg oral tablet: 1 tab(s) orally once a day (at bedtime)  cetirizine 10 mg oral tablet: 1 tab(s) orally once a day  clopidogrel 75 mg oral tablet: 1 tab(s) orally once a day  Feosol 325 mg (65 mg elemental iron) oral tablet: 1 tab(s) orally once a day  gabapentin 300 mg oral tablet: orally 2 times a day  ibuprofen 200 mg oral tablet: 1 tab(s) orally every 4 hours  Kombiglyze XR 5 mg-1000 mg oral tablet, extended release: 1 tab(s) orally once a day (in the evening)  levothyroxine 75 mcg (0.075 mg) oral tablet: 1 tab(s) orally once a day  metoprolol succinate 25 mg oral capsule, extended release: 1 cap(s) orally once a day  mupirocin 2% topical ointment: 1 application in each nostril 2 times a day Apply twice a day for 5 days start using 11/24-11/28/23  NexIUM 40 mg oral delayed release capsule: 1 cap(s) orally once a day  NIFEdipine 30 mg oral tablet, extended release: 1 tab(s) orally once a day  traMADol 100 mg oral tablet: 1 tab(s) orally 2 times a day   apixaban 2.5 mg oral tablet: 1 tab(s) orally 2 times a day  aspirin 81 mg oral tablet: 1 tab(s) orally once a day  atorvastatin 40 mg oral tablet: 1 tab(s) orally once a day (at bedtime)  cetirizine 10 mg oral tablet: 1 tab(s) orally once a day  Feosol 325 mg (65 mg elemental iron) oral tablet: 1 tab(s) orally once a day  gabapentin 300 mg oral tablet: orally 2 times a day  levothyroxine 75 mcg (0.075 mg) oral tablet: 1 tab(s) orally once a day  metoprolol succinate 25 mg oral capsule, extended release: 1 cap(s) orally once a day  NexIUM 40 mg oral delayed release capsule: 1 cap(s) orally once a day  traMADol 100 mg oral tablet: 1 tab(s) orally 2 times a day

## 2023-11-30 NOTE — CONSULT NOTE ADULT - SUBJECTIVE AND OBJECTIVE BOX
[facility] DIVISION OF KIDNEY DISEASES AND HYPERTENSION -- INITIAL CONSULT NOTE  --------------------------------------------------------------------------------  HPI:  77 y/o female pt with history of AS,breast cancer s/p lumpectomy on left s/p radiation.  preDM, DVT and PE on eliquis HTN, hypothyroidism, HLD, CAD s/p PCI 6/2023 on ASA and plavix, ankylosing spondylitis pt. states previously on remicaid and no longer on it since 4/2023, recent fall back fracture, CKD admitted for TAVR. Pt is now sp Percutaneous Transfemoral TAVR via Right Common Femoral Artery (11/29) with Dr Shelton.  Nephrology consulted for Florentino on CKD.          PAST HISTORY  --------------------------------------------------------------------------------  PAST MEDICAL & SURGICAL HISTORY:  Hypertension      Prediabetes      Breast cancer  2006, surgery, radiation and hormone therapy      Hypothyroidism      Hyperlipidemia      Skin cancer      COVID-19 vaccine series completed      Nonrheumatic aortic valve stenosis      H/O hyperkalemia      CAD S/P percutaneous coronary angioplasty      DVT (deep venous thrombosis)      Pulmonary embolism      Former smoker      H/O ankylosing spondylitis      History of cholecystectomy  1979      H/O total hysterectomy  1987      H/O laminectomy  lumbar 1992      History of lumpectomy  left 2006      S/P tonsillectomy        FAMILY HISTORY:  FH: Parkinson's disease    FH: heart disease    Family history of bone cancer    FH: Down syndrome (Child)    FH: mitral valve repair (Child)      PAST SOCIAL HISTORY:    ALLERGIES & MEDICATIONS  --------------------------------------------------------------------------------  Allergies    ACE inhibitors (Angioedema)  morphine (Hives; Nausea)    Intolerances      Standing Inpatient Medications  aspirin enteric coated 81 milliGRAM(s) Oral daily  atorvastatin 40 milliGRAM(s) Oral at bedtime  dextrose 5%. 1000 milliLiter(s) IV Continuous <Continuous>  dextrose 5%. 1000 milliLiter(s) IV Continuous <Continuous>  dextrose 50% Injectable 25 Gram(s) IV Push once  dextrose 50% Injectable 25 Gram(s) IV Push once  dextrose 50% Injectable 12.5 Gram(s) IV Push once  ferrous    sulfate 325 milliGRAM(s) Oral daily  gabapentin 300 milliGRAM(s) Oral two times a day  glucagon  Injectable 1 milliGRAM(s) IntraMuscular once  insulin lispro (ADMELOG) corrective regimen sliding scale   SubCutaneous Before meals and at bedtime  levothyroxine 75 MICROGram(s) Oral daily  loratadine 10 milliGRAM(s) Oral daily  pantoprazole    Tablet 40 milliGRAM(s) Oral daily  polyethylene glycol 3350 17 Gram(s) Oral daily  sodium chloride 0.9% lock flush 3 milliLiter(s) IV Push every 8 hours  sodium chloride 0.9%. 500 milliLiter(s) IV Continuous <Continuous>  sodium chloride 0.9%. 1000 milliLiter(s) IV Continuous <Continuous>    PRN Inpatient Medications  dextrose Oral Gel 15 Gram(s) Oral once PRN  hydrALAZINE Injectable 5 milliGRAM(s) IV Push every 6 hours PRN      REVIEW OF SYSTEMS  --------------------------------------------------------------------------------  Gen: No weight changes, fatigue, fevers/chills, weakness  Skin: No rashes  Head/Eyes/Ears/Mouth: No headache; Normal hearing; Normal vision w/o blurriness; No sinus pain/discomfort, sore throat  Respiratory: No dyspnea, cough, wheezing, hemoptysis  CV: No chest pain, PND, orthopnea  GI: No abdominal pain, diarrhea, constipation, nausea, vomiting, melena, hematochezia  : No increased frequency, dysuria, hematuria, nocturia  MSK: No joint pain/swelling; no back pain; no edema  Neuro: No dizziness/lightheadedness, weakness, seizures, numbness, tingling  Heme: No easy bruising or bleeding  Endo: No heat/cold intolerance  Psych: No significant nervousness, anxiety, stress, depression    All other systems were reviewed and are negative, except as noted.    VITALS/PHYSICAL EXAM  --------------------------------------------------------------------------------  T(C): 36.5 (11-30-23 @ 12:15), Max: 36.7 (11-29-23 @ 19:30)  HR: 72 (11-30-23 @ 12:15) (58 - 72)  BP: 106/59 (11-30-23 @ 12:15) (99/51 - 145/50)  RR: 15 (11-30-23 @ 12:15) (11 - 20)  SpO2: 96% (11-30-23 @ 12:15) (93% - 100%)  Wt(kg): --  Height (cm): 152.4 (11-29-23 @ 10:18)  Weight (kg): 61.099 (11-29-23 @ 10:18)  BMI (kg/m2): 26.3 (11-29-23 @ 10:18)  BSA (m2): 1.58 (11-29-23 @ 10:18)      11-29-23 @ 07:01  -  11-30-23 @ 07:00  --------------------------------------------------------  IN: 250 mL / OUT: 0 mL / NET: 250 mL    11-30-23 @ 07:01  -  11-30-23 @ 14:27  --------------------------------------------------------  IN: 0 mL / OUT: 700 mL / NET: -700 mL      Physical Exam:  	Gen: NAD, well-appearing  	HEENT: PERRL, supple neck, clear oropharynx  	Pulm: CTA B/L  	CV: RRR, S1S2; no rub  	Back: No spinal or CVA tenderness; no sacral edema  	Abd: +BS, soft, nontender/nondistended  	: No suprapubic tenderness  	UE: Warm, FROM, no clubbing, intact strength; no edema; no asterixis  	LE: Warm, FROM, no clubbing, intact strength; no edema  	Neuro: No focal deficits, intact gait  	Psych: Normal affect and mood  	Skin: Warm, without rashes  	Vascular access:    LABS/STUDIES  --------------------------------------------------------------------------------              9.5    9.39  >-----------<  292      [11-30-23 @ 01:32]              27.8     135  |  96  |  80.6  ----------------------------<  75      [11-30-23 @ 01:32]  4.2   |  23.0  |  4.21        Ca     8.0     [11-30-23 @ 01:32]      Mg     1.7     [11-30-23 @ 01:32]    TPro  6.4  /  Alb  3.1  /  TBili  0.3  /  DBili  x   /  AST  19  /  ALT  8   /  AlkPhos  66  [11-29-23 @ 17:52]    PT/INR: PT 16.4 , INR 1.49       [11-30-23 @ 01:32]  PTT: 44.3       [11-30-23 @ 01:32]    CK 65      [11-30-23 @ 01:32]    Creatinine Trend:  SCr 4.21 [11-30 @ 01:32]  SCr 4.38 [11-29 @ 17:52]  SCr 4.74 [11-29 @ 10:40]  SCr 1.87 [11-10 @ 11:35]    Urinalysis - [11-30-23 @ 01:32]      Color  / Appearance  / SG  / pH       Gluc 75 / Ketone   / Bili  / Urobili        Blood  / Protein  / Leuk Est  / Nitrite       RBC  / WBC  / Hyaline  / Gran  / Sq Epi  / Non Sq Epi  / Bacteria       HbA1c 6.7      [04-22-19 @ 12:32]  TSH 1.95      [11-10-23 @ 11:35]

## 2023-11-30 NOTE — DISCHARGE NOTE PROVIDER - NSDCFUADDINST_GEN_ALL_CORE_FT
Please call the Cardiothoracic Surgery office at [phone] if you are experiencing any shortness of breath, chest pain, fevers or chills, drainage from the incisions, persistent nausea, vomiting or if you have any questions about your medications. If the symptoms are severe, call 911 and go to the nearest hospital. You can also call [phone] for an emergency [facility] ambulance, which will take you to the closest [facility].    If you need any assistance for making any appointments for a new consult or referral in any specialty, please call our [facility] Clinical Coordination Center at [phone].

## 2023-11-30 NOTE — DISCHARGE NOTE PROVIDER - NSDCFUADDAPPT_GEN_ALL_CORE_FT
1. Follow up with Dr. Shelton on ___  The cardiac surgery office is located at [facility], first floor. Take a left at the end of the lobby until the end of that  (past the elevator bank). Make a left and the office is on your right across from the elevators.    Please be ~ 30 mins early on the day of your appointment to have a chest xray (script is in your folder) taken on the first floor in radiology PRIOR to going to the CT surgery office.    Your Care Navigator Nurse Practitioner will be in touch to see you in your home within a few days from discharge. The Follow Your Heart program can help ensure you understand your medications, discharge instructions and answer any questions you may have at that time. They are also a great source to address concerns during the day and may be reached at [phone].    Follow up with cardiology and renal in 1-2 weeks of discharge.  Please follow up with Dr. Shelton by calling the CT Surgery office at [phone] on the next open business day to make an appointment.   The cardiac surgery office is located at [facility], first floor. Take a left at the end of the lobby until the end of that  (past the elevator bank). Make a left and the office is on your right across from the elevators.    Please be ~ 30 mins early on the day of your appointment to have a chest xray (script is in your folder) taken on the first floor in radiology PRIOR to going to the CT surgery office.    Your Care Navigator Nurse Practitioner will be in touch to see you in your home within a few days from discharge. The Follow Your Heart program can help ensure you understand your medications, discharge instructions and answer any questions you may have at that time. They are also a great source to address concerns during the day and may be reached at [phone].    Follow up with cardiology and renal in 1-2 weeks of discharge.

## 2023-11-30 NOTE — DISCHARGE NOTE PROVIDER - NSDCCPCAREPLAN_GEN_ALL_CORE_FT
PRINCIPAL DISCHARGE DIAGNOSIS  Diagnosis: Nonrheumatic aortic valve stenosis  Assessment and Plan of Treatment: - Keep the surgical sites clean and dry.  You may shower and pat the site dry.  - Monitor the surgical sites for signs of redness or drainage, these should be reported to your doctor immediately.  - You will receive a wallet card about your new valve in the mail.  Please carry it with you to present to anyone who may ask if you have any medical implants.  - Be sure to inform your doctors including your dentist about your valve since you will need to take antibiotics to reduce the risk of infection before certain medical and dental procedures.  - You will be given an appointment to follow up with your doctor in approximately 1-2 weeks.  It is important to keep this appointment so that your new valve can be assessed.  - You may resume all your normal activities.   You will be discharged with a heart rythm monitoring device called an "MCOT" that will be with your for 30 days.   Make sure to follow the insutrctions provided in the hospital.   Reminders: You can shower with the device. Charge the phone every other day for an hour. Change the patch every 5 days and charge your monitor at that time. Do not charge devices overnight. If you need to charge the devices sooner than recommended, you will get an alarm on the devices to indicate you to do so. Carry these devices with you at ALL times.     PRINCIPAL DISCHARGE DIAGNOSIS  Diagnosis: Nonrheumatic aortic valve stenosis  Assessment and Plan of Treatment: - Keep the surgical sites clean and dry.  You may shower and pat the site dry.  - Monitor the surgical sites for signs of redness or drainage, these should be reported to your doctor immediately.  - You will receive a wallet card about your new valve in the mail.  Please carry it with you to present to anyone who may ask if you have any medical implants.  - Be sure to inform your doctors including your dentist about your valve since you will need to take antibiotics to reduce the risk of infection before certain medical and dental procedures.  - You will be given an appointment to follow up with your doctor in approximately 1-2 weeks.  It is important to keep this appointment so that your new valve can be assessed.  - You may resume all your normal activities.   You will be discharged with a heart rythm monitoring device called an "MCOT" that will be with your for 30 days.   Make sure to follow the insutrctions provided in the hospital.   Reminders: You can shower with the device. Charge the phone every other day for an hour. Change the patch every 5 days and charge your monitor at that time. Do not charge devices overnight. If you need to charge the devices sooner than recommended, you will get an alarm on the devices to indicate you to do so. Carry these devices with you at ALL times.      SECONDARY DISCHARGE DIAGNOSES  Diagnosis: Prediabetes  Assessment and Plan of Treatment: stop your kombiglyze and follow up with your primary care doctor before resuming. you may not need any additional medication as your A1C is 5.5 and your fingersticks in the hospital have been controlled without medication.    Diagnosis: Acute kidney injury superimposed on CKD  Assessment and Plan of Treatment: you had elevated kidney numbers above your baseline. your kidney numbers have returned to your basline by time of discharge. some of your medications have been stopped or changed due to this. please follow up with your kidney doctor in 1-2 weeks.

## 2023-11-30 NOTE — DISCHARGE NOTE PROVIDER - NSDCCPTREATMENT_GEN_ALL_CORE_FT
PRINCIPAL PROCEDURE  Procedure: TAVR, percutaneous  Findings and Treatment: Percutaneous Transfemoral TAVR via Right Common Femoral Artery (23mm Trevor Resilia) (NCT# [id number]) (STS/ACC TVT Registry Patient ID# [id number])

## 2023-12-01 ENCOUNTER — TRANSCRIPTION ENCOUNTER (OUTPATIENT)
Age: 78
End: 2023-12-01

## 2023-12-01 LAB
ANION GAP SERPL CALC-SCNC: 11 MMOL/L — SIGNIFICANT CHANGE UP (ref 5–17)
ANION GAP SERPL CALC-SCNC: 11 MMOL/L — SIGNIFICANT CHANGE UP (ref 5–17)
ANION GAP SERPL CALC-SCNC: 14 MMOL/L — SIGNIFICANT CHANGE UP (ref 5–17)
ANION GAP SERPL CALC-SCNC: 14 MMOL/L — SIGNIFICANT CHANGE UP (ref 5–17)
BUN SERPL-MCNC: 50.7 MG/DL — HIGH (ref 8–20)
BUN SERPL-MCNC: 50.7 MG/DL — HIGH (ref 8–20)
BUN SERPL-MCNC: 63 MG/DL — HIGH (ref 8–20)
BUN SERPL-MCNC: 63 MG/DL — HIGH (ref 8–20)
CALCIUM SERPL-MCNC: 8.2 MG/DL — LOW (ref 8.4–10.5)
CALCIUM SERPL-MCNC: 8.2 MG/DL — LOW (ref 8.4–10.5)
CALCIUM SERPL-MCNC: 8.3 MG/DL — LOW (ref 8.4–10.5)
CALCIUM SERPL-MCNC: 8.3 MG/DL — LOW (ref 8.4–10.5)
CHLORIDE SERPL-SCNC: 100 MMOL/L — SIGNIFICANT CHANGE UP (ref 96–108)
CHLORIDE SERPL-SCNC: 100 MMOL/L — SIGNIFICANT CHANGE UP (ref 96–108)
CHLORIDE SERPL-SCNC: 101 MMOL/L — SIGNIFICANT CHANGE UP (ref 96–108)
CHLORIDE SERPL-SCNC: 101 MMOL/L — SIGNIFICANT CHANGE UP (ref 96–108)
CO2 SERPL-SCNC: 23 MMOL/L — SIGNIFICANT CHANGE UP (ref 22–29)
CO2 SERPL-SCNC: 23 MMOL/L — SIGNIFICANT CHANGE UP (ref 22–29)
CO2 SERPL-SCNC: 26 MMOL/L — SIGNIFICANT CHANGE UP (ref 22–29)
CO2 SERPL-SCNC: 26 MMOL/L — SIGNIFICANT CHANGE UP (ref 22–29)
CREAT SERPL-MCNC: 2.2 MG/DL — HIGH (ref 0.5–1.3)
CREAT SERPL-MCNC: 2.2 MG/DL — HIGH (ref 0.5–1.3)
CREAT SERPL-MCNC: 2.88 MG/DL — HIGH (ref 0.5–1.3)
CREAT SERPL-MCNC: 2.88 MG/DL — HIGH (ref 0.5–1.3)
EGFR: 16 ML/MIN/1.73M2 — LOW
EGFR: 16 ML/MIN/1.73M2 — LOW
EGFR: 22 ML/MIN/1.73M2 — LOW
EGFR: 22 ML/MIN/1.73M2 — LOW
GLUCOSE BLDC GLUCOMTR-MCNC: 115 MG/DL — HIGH (ref 70–99)
GLUCOSE BLDC GLUCOMTR-MCNC: 115 MG/DL — HIGH (ref 70–99)
GLUCOSE BLDC GLUCOMTR-MCNC: 149 MG/DL — HIGH (ref 70–99)
GLUCOSE BLDC GLUCOMTR-MCNC: 149 MG/DL — HIGH (ref 70–99)
GLUCOSE SERPL-MCNC: 144 MG/DL — HIGH (ref 70–99)
GLUCOSE SERPL-MCNC: 144 MG/DL — HIGH (ref 70–99)
GLUCOSE SERPL-MCNC: 89 MG/DL — SIGNIFICANT CHANGE UP (ref 70–99)
GLUCOSE SERPL-MCNC: 89 MG/DL — SIGNIFICANT CHANGE UP (ref 70–99)
HCT VFR BLD CALC: 28.6 % — LOW (ref 34.5–45)
HCT VFR BLD CALC: 28.6 % — LOW (ref 34.5–45)
HGB BLD-MCNC: 9.5 G/DL — LOW (ref 11.5–15.5)
HGB BLD-MCNC: 9.5 G/DL — LOW (ref 11.5–15.5)
MAGNESIUM SERPL-MCNC: 1.8 MG/DL — SIGNIFICANT CHANGE UP (ref 1.8–2.6)
MAGNESIUM SERPL-MCNC: 1.8 MG/DL — SIGNIFICANT CHANGE UP (ref 1.8–2.6)
MCHC RBC-ENTMCNC: 28.2 PG — SIGNIFICANT CHANGE UP (ref 27–34)
MCHC RBC-ENTMCNC: 28.2 PG — SIGNIFICANT CHANGE UP (ref 27–34)
MCHC RBC-ENTMCNC: 33.2 GM/DL — SIGNIFICANT CHANGE UP (ref 32–36)
MCHC RBC-ENTMCNC: 33.2 GM/DL — SIGNIFICANT CHANGE UP (ref 32–36)
MCV RBC AUTO: 84.9 FL — SIGNIFICANT CHANGE UP (ref 80–100)
MCV RBC AUTO: 84.9 FL — SIGNIFICANT CHANGE UP (ref 80–100)
PLATELET # BLD AUTO: 250 K/UL — SIGNIFICANT CHANGE UP (ref 150–400)
PLATELET # BLD AUTO: 250 K/UL — SIGNIFICANT CHANGE UP (ref 150–400)
POTASSIUM SERPL-MCNC: 3.9 MMOL/L — SIGNIFICANT CHANGE UP (ref 3.5–5.3)
POTASSIUM SERPL-MCNC: 3.9 MMOL/L — SIGNIFICANT CHANGE UP (ref 3.5–5.3)
POTASSIUM SERPL-MCNC: 4 MMOL/L — SIGNIFICANT CHANGE UP (ref 3.5–5.3)
POTASSIUM SERPL-MCNC: 4 MMOL/L — SIGNIFICANT CHANGE UP (ref 3.5–5.3)
POTASSIUM SERPL-SCNC: 3.9 MMOL/L — SIGNIFICANT CHANGE UP (ref 3.5–5.3)
POTASSIUM SERPL-SCNC: 3.9 MMOL/L — SIGNIFICANT CHANGE UP (ref 3.5–5.3)
POTASSIUM SERPL-SCNC: 4 MMOL/L — SIGNIFICANT CHANGE UP (ref 3.5–5.3)
POTASSIUM SERPL-SCNC: 4 MMOL/L — SIGNIFICANT CHANGE UP (ref 3.5–5.3)
RBC # BLD: 3.37 M/UL — LOW (ref 3.8–5.2)
RBC # BLD: 3.37 M/UL — LOW (ref 3.8–5.2)
RBC # FLD: 14.5 % — SIGNIFICANT CHANGE UP (ref 10.3–14.5)
RBC # FLD: 14.5 % — SIGNIFICANT CHANGE UP (ref 10.3–14.5)
SODIUM SERPL-SCNC: 137 MMOL/L — SIGNIFICANT CHANGE UP (ref 135–145)
SODIUM SERPL-SCNC: 137 MMOL/L — SIGNIFICANT CHANGE UP (ref 135–145)
SODIUM SERPL-SCNC: 138 MMOL/L — SIGNIFICANT CHANGE UP (ref 135–145)
SODIUM SERPL-SCNC: 138 MMOL/L — SIGNIFICANT CHANGE UP (ref 135–145)
WBC # BLD: 7.91 K/UL — SIGNIFICANT CHANGE UP (ref 3.8–10.5)
WBC # BLD: 7.91 K/UL — SIGNIFICANT CHANGE UP (ref 3.8–10.5)
WBC # FLD AUTO: 7.91 K/UL — SIGNIFICANT CHANGE UP (ref 3.8–10.5)
WBC # FLD AUTO: 7.91 K/UL — SIGNIFICANT CHANGE UP (ref 3.8–10.5)

## 2023-12-01 PROCEDURE — 99223 1ST HOSP IP/OBS HIGH 75: CPT

## 2023-12-01 PROCEDURE — 99232 SBSQ HOSP IP/OBS MODERATE 35: CPT

## 2023-12-01 PROCEDURE — 71045 X-RAY EXAM CHEST 1 VIEW: CPT | Mod: 26

## 2023-12-01 PROCEDURE — 93010 ELECTROCARDIOGRAM REPORT: CPT

## 2023-12-01 RX ORDER — ERYTHROPOIETIN 10000 [IU]/ML
20000 INJECTION, SOLUTION INTRAVENOUS; SUBCUTANEOUS
Refills: 0 | Status: DISCONTINUED | OUTPATIENT
Start: 2023-12-01 | End: 2023-12-02

## 2023-12-01 RX ADMIN — ATORVASTATIN CALCIUM 40 MILLIGRAM(S): 80 TABLET, FILM COATED ORAL at 21:43

## 2023-12-01 RX ADMIN — SODIUM CHLORIDE 3 MILLILITER(S): 9 INJECTION INTRAMUSCULAR; INTRAVENOUS; SUBCUTANEOUS at 06:03

## 2023-12-01 RX ADMIN — Medication 81 MILLIGRAM(S): at 07:49

## 2023-12-01 RX ADMIN — ERYTHROPOIETIN 20000 UNIT(S): 10000 INJECTION, SOLUTION INTRAVENOUS; SUBCUTANEOUS at 12:50

## 2023-12-01 RX ADMIN — SODIUM CHLORIDE 3 MILLILITER(S): 9 INJECTION INTRAMUSCULAR; INTRAVENOUS; SUBCUTANEOUS at 13:31

## 2023-12-01 RX ADMIN — GABAPENTIN 300 MILLIGRAM(S): 400 CAPSULE ORAL at 17:07

## 2023-12-01 RX ADMIN — POLYETHYLENE GLYCOL 3350 17 GRAM(S): 17 POWDER, FOR SOLUTION ORAL at 07:50

## 2023-12-01 RX ADMIN — PANTOPRAZOLE SODIUM 40 MILLIGRAM(S): 20 TABLET, DELAYED RELEASE ORAL at 07:49

## 2023-12-01 RX ADMIN — Medication 325 MILLIGRAM(S): at 07:50

## 2023-12-01 RX ADMIN — GABAPENTIN 300 MILLIGRAM(S): 400 CAPSULE ORAL at 06:06

## 2023-12-01 RX ADMIN — SODIUM CHLORIDE 3 MILLILITER(S): 9 INJECTION INTRAMUSCULAR; INTRAVENOUS; SUBCUTANEOUS at 21:42

## 2023-12-01 RX ADMIN — LORATADINE 10 MILLIGRAM(S): 10 TABLET ORAL at 07:51

## 2023-12-01 RX ADMIN — Medication 75 MICROGRAM(S): at 06:05

## 2023-12-01 NOTE — CONSULT NOTE ADULT - ASSESSMENT
78F w/ HTN, HLD, hypothyroidism, prediabetes, likely ckd, hx of dvt/eliquis, CAD s/p PCI 6/2023, hx of ankylosing spondylitis, back fracture with back brace, admitted for TAVR with Dr. James Shelton on 11/29/23 secondary to nonrheumatic aortic valve stenosis. of note, patient's daughter (who had down's syndrome) passed away 9/2023  Consult for diabetes mgmt.  Course cb RAMÍREZ on CKD    Prediabetes  -a1c 5.5  -probably had diabetes if kombiglyze was started  -FS well controlled, only on sliding scale for now  -discussed at length with the patient  -can consider tradjenta 5mg daily but patient would like to try no meds for diabetes upon discharge  -okay to stop kombiglyze and not be discharged on anti diabetic meds for discharge  -can follow up with her pmd regarding this as well    Hypothyroidism- tsh normal, continue home dose LT4    AS- s/p TAVR 11/29, care per primary team    RAMÍREZ on CKD- improving, renal following 78F w/ HTN, HLD, hypothyroidism, prediabetes, likely ckd, hx of dvt/eliquis, CAD s/p PCI 6/2023, hx of ankylosing spondylitis, back fracture with back brace, admitted for TAVR with Dr. James Shelton on 11/29/23 secondary to nonrheumatic aortic valve stenosis. of note, patient's daughter (who had down's syndrome) passed away 9/2023  Consult for diabetes mgmt.  Course cb RAMÍREZ on CKD    Prediabetes  -a1c 5.5  -probably had diabetes if kombiglyze was started  -FS well controlled, only on sliding scale for now  -discussed at length with the patient  -can consider tradjenta 5mg daily but patient would like to try no meds for diabetes upon discharge  -okay to stop kombiglyze and not be discharged on anti diabetic meds for discharge  -can follow up with her pmd regarding this as well    Hypothyroidism- tsh normal, continue home dose LT4    AS- s/p TAVR 11/29, care per primary team    RAMÍREZ on CKD- improving, renal following      thank you for the consult  please reconsult as necessary and call with questions  will sign off

## 2023-12-01 NOTE — CONSULT NOTE ADULT - SUBJECTIVE AND OBJECTIVE BOX
Patient is a 78y old  Female who presents with a chief complaint of tavr (01 Dec 2023 11:06)    HPI:  78F w/ HTN, HLD, hypothyroidism, T2DM cb CAD s/p PCI 6/2023 presents today to PST pending TAVR with Dr. James Shelton on 11/29/23 secondary to nonrheumatic aortic valve stenosis. Pt. with history of preDM, DVT and PE on eliquis- denies bleeding or falls Last fall Pt. states 3 weeks ago she found out she had a back fracture and has been wearing a back brace. Pt. states she was in the hospital in June 2023 and since then she has had an occasionally productive cough in the morning which lingers towards the afternoon, States she has seen a pulmonary dr. for this. Denies SOB, denies wheezing.  Pt. states she went to the hospital 6/2023 due to SOB, and had cardiac catheterization at that time, states she was told to have TAVR at that time, but that pt. states her daughter passed away 9/2023, she was her primary care taker so she could not handle having procedure at that time. Denies CP, denies dizziness or lightheadedness. History of breast cancer s/p lumpectomy on left s/p radiation. History of ankylosing spondylitis pt. states previously on remicaid and no longer on it since 4/2023 she states.  (10 Nov 2023 11:50)      PAST MEDICAL & SURGICAL HISTORY:  Hypertension    Prediabetes    Breast cancer  2006, surgery, radiation and hormone therapy    Hypothyroidism    Hyperlipidemia    Skin cancer    COVID-19 vaccine series completed    Nonrheumatic aortic valve stenosis    H/O hyperkalemia    CAD S/P percutaneous coronary angioplasty    DVT (deep venous thrombosis)    Pulmonary embolism    Former smoker    H/O ankylosing spondylitis    History of cholecystectomy  1979    H/O total hysterectomy  1987    H/O laminectomy  lumbar 1992    History of lumpectomy  left 2006    S/P tonsillectomy        Social History:  see above      FAMILY HISTORY:  FH: Parkinson's disease    FH: heart disease    Family history of bone cancer    FH: Down syndrome (Child)    FH: mitral valve repair (Child)          Allergies    ACE inhibitors (Angioedema)  morphine (Hives; Nausea)    Intolerances        ROS as noted in the HPI    MEDICATIONS  (STANDING):  aspirin enteric coated 81 milliGRAM(s) Oral daily  atorvastatin 40 milliGRAM(s) Oral at bedtime  dextrose 5%. 1000 milliLiter(s) (100 mL/Hr) IV Continuous <Continuous>  dextrose 5%. 1000 milliLiter(s) (50 mL/Hr) IV Continuous <Continuous>  dextrose 50% Injectable 12.5 Gram(s) IV Push once  dextrose 50% Injectable 25 Gram(s) IV Push once  dextrose 50% Injectable 25 Gram(s) IV Push once  epoetin cris-epbx (RETACRIT) Injectable 12559 Unit(s) SubCutaneous <User Schedule>  ferrous    sulfate 325 milliGRAM(s) Oral daily  gabapentin 300 milliGRAM(s) Oral two times a day  glucagon  Injectable 1 milliGRAM(s) IntraMuscular once  insulin lispro (ADMELOG) corrective regimen sliding scale   SubCutaneous Before meals and at bedtime  levothyroxine 75 MICROGram(s) Oral daily  loratadine 10 milliGRAM(s) Oral daily  pantoprazole    Tablet 40 milliGRAM(s) Oral daily  polyethylene glycol 3350 17 Gram(s) Oral daily  sodium chloride 0.9% lock flush 3 milliLiter(s) IV Push every 8 hours  sodium chloride 0.9%. 1000 milliLiter(s) (10 mL/Hr) IV Continuous <Continuous>  sodium chloride 0.9%. 500 milliLiter(s) (75 mL/Hr) IV Continuous <Continuous>    MEDICATIONS  (PRN):  dextrose Oral Gel 15 Gram(s) Oral once PRN Blood Glucose LESS THAN 70 milliGRAM(s)/deciliter  hydrALAZINE Injectable 5 milliGRAM(s) IV Push every 6 hours PRN SBP > 170      Vital Signs Last 24 Hrs  T(C): 36.3 (01 Dec 2023 08:04), Max: 37.1 (30 Nov 2023 20:48)  T(F): 97.4 (01 Dec 2023 08:04), Max: 98.7 (30 Nov 2023 20:48)  HR: 72 (01 Dec 2023 08:04) (70 - 86)  BP: 149/72 (01 Dec 2023 08:04) (106/59 - 153/69)  BP(mean): 74 (30 Nov 2023 12:15) (74 - 74)  RR: 16 (01 Dec 2023 08:04) (15 - 18)  SpO2: 98% (01 Dec 2023 08:04) (95% - 99%)    Parameters below as of 01 Dec 2023 08:04  Patient On (Oxygen Delivery Method): room air          Physical Exam:    Constitutional: NAD, well-developed  HEENT: EOMI, no exophalmos  Neck: trachea midline, no thyroid enlargement  Respiratory: CTAB, normal respirations  Cardiovascular: S1 and S2, RRR  Gastrointestinal: BS+, soft, ntnd  Extremities: No peripheral edema  Neurological: AOx3, no focal deficits  Psychiatric: Normal mood and normal affect  Skin: no rashes, no acanthosis    LABS  12-01    137  |  100  |  63.0<H>  ----------------------------<  89  3.9   |  26.0  |  2.88<H>    Ca    8.3<L>      01 Dec 2023 05:00  Mg     1.8     12-01    TPro  6.4<L>  /  Alb  3.1<L>  /  TBili  0.3<L>  /  DBili  x   /  AST  19  /  ALT  8   /  AlkPhos  66  11-29                          9.5    7.91  )-----------( 250      ( 01 Dec 2023 05:00 )             28.6       A1C with Estimated Average Glucose Result: 5.5 % (11-10-23 @ 11:35)          Albumin: 3.1 g/dL (11-29-23 @ 17:52)  Aspartate Aminotransferase (AST/SGOT): 19 U/L (11-29-23 @ 17:52)  Alanine Aminotransferase (ALT/SGPT): 8 U/L (11-29-23 @ 17:52)  Alkaline Phosphatase: 66 U/L (11-29-23 @ 17:52)          CAPILLARY BLOOD GLUCOSE      POCT Blood Glucose.: 106 mg/dL (30 Nov 2023 21:25)  POCT Blood Glucose.: 91 mg/dL (30 Nov 2023 17:09)  POCT Blood Glucose.: 99 mg/dL (30 Nov 2023 11:51)      Imaging     Patient is a 78y old  Female who presents with a chief complaint of tavr (01 Dec 2023 11:06)    HPI:  78F w/ HTN, HLD, hypothyroidism, prediabetes, hx of dvt/eliquis, likely ckd, CAD s/p PCI 6/2023, hx of ankylosing spondylitis, back fracture with back brace, admitted for TAVR with Dr. James Shelton on 11/29/23 secondary to nonrheumatic aortic valve stenosis. of note, patient's daughter (who had down's syndrome) passed away 9/2023  Consult for diabetes mgmt.  Course cb RAMÍREZ on CKD    patient reported that she did not have diabetes, only prediabetes and was started on kombiglyze from her pmd (who is also a cardiologist)  no fhx of diabetes  has hypothyroidism, thinks that she takes LT4 75mcg daily  no smoking/etoh      PAST MEDICAL & SURGICAL HISTORY:  Hypertension    Prediabetes    Breast cancer  2006, surgery, radiation and hormone therapy    Hypothyroidism    Hyperlipidemia    Skin cancer    COVID-19 vaccine series completed    Nonrheumatic aortic valve stenosis    H/O hyperkalemia    CAD S/P percutaneous coronary angioplasty    DVT (deep venous thrombosis)    Pulmonary embolism    Former smoker    H/O ankylosing spondylitis    History of cholecystectomy  1979    H/O total hysterectomy  1987    H/O laminectomy  lumbar 1992    History of lumpectomy  left 2006    S/P tonsillectomy        Social History:  see above      FAMILY HISTORY:  FH: Parkinson's disease    FH: heart disease    Family history of bone cancer    FH: Down syndrome (Child)    FH: mitral valve repair (Child)          Allergies    ACE inhibitors (Angioedema)  morphine (Hives; Nausea)    Intolerances        ROS as noted in the HPI    MEDICATIONS  (STANDING):  aspirin enteric coated 81 milliGRAM(s) Oral daily  atorvastatin 40 milliGRAM(s) Oral at bedtime  dextrose 5%. 1000 milliLiter(s) (100 mL/Hr) IV Continuous <Continuous>  dextrose 5%. 1000 milliLiter(s) (50 mL/Hr) IV Continuous <Continuous>  dextrose 50% Injectable 12.5 Gram(s) IV Push once  dextrose 50% Injectable 25 Gram(s) IV Push once  dextrose 50% Injectable 25 Gram(s) IV Push once  epoetin cris-epbx (RETACRIT) Injectable 42838 Unit(s) SubCutaneous <User Schedule>  ferrous    sulfate 325 milliGRAM(s) Oral daily  gabapentin 300 milliGRAM(s) Oral two times a day  glucagon  Injectable 1 milliGRAM(s) IntraMuscular once  insulin lispro (ADMELOG) corrective regimen sliding scale   SubCutaneous Before meals and at bedtime  levothyroxine 75 MICROGram(s) Oral daily  loratadine 10 milliGRAM(s) Oral daily  pantoprazole    Tablet 40 milliGRAM(s) Oral daily  polyethylene glycol 3350 17 Gram(s) Oral daily  sodium chloride 0.9% lock flush 3 milliLiter(s) IV Push every 8 hours  sodium chloride 0.9%. 1000 milliLiter(s) (10 mL/Hr) IV Continuous <Continuous>  sodium chloride 0.9%. 500 milliLiter(s) (75 mL/Hr) IV Continuous <Continuous>    MEDICATIONS  (PRN):  dextrose Oral Gel 15 Gram(s) Oral once PRN Blood Glucose LESS THAN 70 milliGRAM(s)/deciliter  hydrALAZINE Injectable 5 milliGRAM(s) IV Push every 6 hours PRN SBP > 170      Vital Signs Last 24 Hrs  T(C): 36.3 (01 Dec 2023 08:04), Max: 37.1 (30 Nov 2023 20:48)  T(F): 97.4 (01 Dec 2023 08:04), Max: 98.7 (30 Nov 2023 20:48)  HR: 72 (01 Dec 2023 08:04) (70 - 86)  BP: 149/72 (01 Dec 2023 08:04) (106/59 - 153/69)  BP(mean): 74 (30 Nov 2023 12:15) (74 - 74)  RR: 16 (01 Dec 2023 08:04) (15 - 18)  SpO2: 98% (01 Dec 2023 08:04) (95% - 99%)    Parameters below as of 01 Dec 2023 08:04  Patient On (Oxygen Delivery Method): room air          Physical Exam:    Constitutional: NAD, younger than stated age  Neck: trachea midline, no thyroid enlargement  Respiratory: CTAB, normal respirations  Cardiovascular: S1 and S2, RRR  Gastrointestinal: BS+, soft, ntnd  Extremities: No peripheral edema  Neurological: AOx3, no focal deficits  Psychiatric: Normal mood and normal affect  Skin: no rashes, no acanthosis    LABS  12-01    137  |  100  |  63.0<H>  ----------------------------<  89  3.9   |  26.0  |  2.88<H>    Ca    8.3<L>      01 Dec 2023 05:00  Mg     1.8     12-01    TPro  6.4<L>  /  Alb  3.1<L>  /  TBili  0.3<L>  /  DBili  x   /  AST  19  /  ALT  8   /  AlkPhos  66  11-29                          9.5    7.91  )-----------( 250      ( 01 Dec 2023 05:00 )             28.6       A1C with Estimated Average Glucose Result: 5.5 % (11-10-23 @ 11:35)          Albumin: 3.1 g/dL (11-29-23 @ 17:52)  Aspartate Aminotransferase (AST/SGOT): 19 U/L (11-29-23 @ 17:52)  Alanine Aminotransferase (ALT/SGPT): 8 U/L (11-29-23 @ 17:52)  Alkaline Phosphatase: 66 U/L (11-29-23 @ 17:52)          CAPILLARY BLOOD GLUCOSE      POCT Blood Glucose.: 106 mg/dL (30 Nov 2023 21:25)  POCT Blood Glucose.: 91 mg/dL (30 Nov 2023 17:09)  POCT Blood Glucose.: 99 mg/dL (30 Nov 2023 11:51)      Imaging

## 2023-12-01 NOTE — DISCHARGE NOTE NURSING/CASE MANAGEMENT/SOCIAL WORK - PATIENT PORTAL LINK FT
You can access the FollowMyHealth Patient Portal offered by [facility] by registering at the following website: [email or web address]. By joining [employer]’s FollowMyHealth portal, you will also be able to view your health information using other applications (apps) compatible with our system.

## 2023-12-01 NOTE — DISCHARGE NOTE NURSING/CASE MANAGEMENT/SOCIAL WORK - NSSCCARECORD_GEN_ALL_CORE
[facility] Birth Control Pills Counseling: Birth Control Pill Counseling: I discussed with the patient the potential side effects of OCPs including but not limited to increased risk of stroke, heart attack, thrombophlebitis, deep venous thrombosis, hepatic adenomas, breast changes, GI upset, headaches, and depression.  The patient verbalized understanding of the proper use and possible adverse effects of OCPs. All of the patient's questions and concerns were addressed.

## 2023-12-01 NOTE — CONSULT NOTE ADULT - ASSESSMENT
CKD(III): RAMÍREZ post TAVR   RAMÍREZ resolving (?) renal hypoperfusion (lowBP/ATN, hypovolemia)  - avoid potential nephrotoxins  - cont gentle IV hydration  - trend labs    Anemia: +CKD component  - add RENATO  - check Fe stores  - target Hgb > 10.0

## 2023-12-01 NOTE — DISCHARGE NOTE NURSING/CASE MANAGEMENT/SOCIAL WORK - NSDCFUADDAPPT_GEN_ALL_CORE_FT
1. Follow up with Dr. Shelton on ___  The cardiac surgery office is located at [facility], first floor. Take a left at the end of the lobby until the end of that  (past the elevator bank). Make a left and the office is on your right across from the elevators.    Please be ~ 30 mins early on the day of your appointment to have a chest xray (script is in your folder) taken on the first floor in radiology PRIOR to going to the CT surgery office.    Your Care Navigator Nurse Practitioner will be in touch to see you in your home within a few days from discharge. The Follow Your Heart program can help ensure you understand your medications, discharge instructions and answer any questions you may have at that time. They are also a great source to address concerns during the day and may be reached at [phone].    Follow up with cardiology and renal in 1-2 weeks of discharge.

## 2023-12-01 NOTE — CONSULT NOTE ADULT - SUBJECTIVE AND OBJECTIVE BOX
HPI: The pt is an 79 yo F PMH + DVT/PE + HTN + Hypothyroid + HLD + CAD/PCI + recent spine fracture who is now s/p TAVR 11/29/23.  Post procedure complicated by RAMÍREZ for which we are called. Pt feels well today w/o acute distress and renal function is improving on repeated labs.  Pt has been seen in our office for CKD but she cannot provide any further details at this time.        PAST MEDICAL & SURGICAL HISTORY:  Hypertension      Prediabetes      Breast cancer  2006, surgery, radiation and hormone therapy      Hypothyroidism      Hyperlipidemia      Skin cancer      COVID-19 vaccine series completed      Nonrheumatic aortic valve stenosis      H/O hyperkalemia      CAD S/P percutaneous coronary angioplasty      DVT (deep venous thrombosis)      Pulmonary embolism      Former smoker      H/O ankylosing spondylitis      History of cholecystectomy  1979      H/O total hysterectomy  1987      H/O laminectomy  lumbar 1992      History of lumpectomy  left 2006      S/P tonsillectomy          FAMILY HISTORY:  FH: Parkinson's disease    FH: heart disease    Family history of bone cancer    FH: Down syndrome (Child)    FH: mitral valve repair (Child)      Social History:  + past tobacco; no etoh nor drugs    MEDICATIONS  (STANDING):  aspirin enteric coated 81 milliGRAM(s) Oral daily  atorvastatin 40 milliGRAM(s) Oral at bedtime  dextrose 5%. 1000 milliLiter(s) (100 mL/Hr) IV Continuous <Continuous>  dextrose 5%. 1000 milliLiter(s) (50 mL/Hr) IV Continuous <Continuous>  dextrose 50% Injectable 25 Gram(s) IV Push once  dextrose 50% Injectable 25 Gram(s) IV Push once  dextrose 50% Injectable 12.5 Gram(s) IV Push once  ferrous    sulfate 325 milliGRAM(s) Oral daily  gabapentin 300 milliGRAM(s) Oral two times a day  glucagon  Injectable 1 milliGRAM(s) IntraMuscular once  insulin lispro (ADMELOG) corrective regimen sliding scale   SubCutaneous Before meals and at bedtime  levothyroxine 75 MICROGram(s) Oral daily  loratadine 10 milliGRAM(s) Oral daily  pantoprazole    Tablet 40 milliGRAM(s) Oral daily  polyethylene glycol 3350 17 Gram(s) Oral daily  sodium chloride 0.9% lock flush 3 milliLiter(s) IV Push every 8 hours  sodium chloride 0.9%. 500 milliLiter(s) (75 mL/Hr) IV Continuous <Continuous>  sodium chloride 0.9%. 1000 milliLiter(s) (10 mL/Hr) IV Continuous <Continuous>    MEDICATIONS  (PRN):  dextrose Oral Gel 15 Gram(s) Oral once PRN Blood Glucose LESS THAN 70 milliGRAM(s)/deciliter  hydrALAZINE Injectable 5 milliGRAM(s) IV Push every 6 hours PRN SBP > 170      Allergies    ACE inhibitors (Angioedema)  morphine (Hives; Nausea)    Intolerances        REVIEW OF SYSTEMS:  CONSTITUTIONAL: No fever, weight loss, + fatigue  EYES: No eye pain, visual disturbances, or discharge  ENMT:  No difficulty hearing, tinnitus, vertigo; No sinus or throat pain  NECK: No pain or stiffness  BREASTS: No pain, masses, or nipple discharge  RESPIRATORY: No cough, wheezing, chills or hemoptysis; + occ shortness of breath  CARDIOVASCULAR: No chest pain, palpitations, dizziness, or leg swelling  GASTROINTESTINAL: No abdominal or epigastric pain. No nausea, vomiting, or hematemesis; No diarrhea or constipation. No melena or hematochezia.  GENITOURINARY: No dysuria, frequency, hematuria, or incontinence  NEUROLOGICAL: No headaches, memory loss, loss of strength, numbness, or tremors  SKIN: No itching, burning, rashes, or lesions   PSYCHIATRIC: No depression, anxiety, mood swings, or difficulty sleeping        Vital Signs Last 24 Hrs  T(C): 36.3 (01 Dec 2023 08:04), Max: 37.1 (30 Nov 2023 20:48)  T(F): 97.4 (01 Dec 2023 08:04), Max: 98.7 (30 Nov 2023 20:48)  HR: 72 (01 Dec 2023 08:04) (70 - 86)  BP: 149/72 (01 Dec 2023 08:04) (106/59 - 153/69)  BP(mean): 74 (30 Nov 2023 12:15) (74 - 74)  RR: 16 (01 Dec 2023 08:04) (15 - 18)  SpO2: 98% (01 Dec 2023 08:04) (95% - 99%)    Parameters below as of 01 Dec 2023 08:04  Patient On (Oxygen Delivery Method): room air        PHYSICAL EXAM:  GENERAL: NAD, fatigued  HEAD:  Atraumatic, Normocephalic  EYES: Conjunctiva and sclera clear  ENMT: Moist mucous membranes  NECK: Supple, No JVD  NERVOUS SYSTEM:  Alert & Oriented X3, intact and symmetric  CHEST/LUNG: Clear, diminished BS  HEART: Regular rate and rhythm; No rub  ABDOMEN: Soft, Nontender, Nondistended; BS+  EXTREMITIES:  2+ Peripheral Pulses, No edema  SKIN: No rashes or lesions      LABS:                        9.5    7.91  )-----------( 250      ( 01 Dec 2023 05:00 )             28.6     12-01    137  |  100  |  63.0<H>  ----------------------------<  89  3.9   |  26.0  |  2.88<H>    Creatinine: 1.87 mg/dL (11.10.23)    Ca    8.3<L>      01 Dec 2023 05:00  Mg     1.8     12-01    TPro  6.4<L>  /  Alb  3.1<L>  /  TBili  0.3<L>  /  DBili  x   /  AST  19  /  ALT  8   /  AlkPhos  66  11-29    PT/INR - ( 30 Nov 2023 01:32 )   PT: 16.4 sec;   INR: 1.49 ratio         PTT - ( 30 Nov 2023 01:32 )  PTT:44.3 sec  Urinalysis Basic - ( 01 Dec 2023 05:00 )    Color: x / Appearance: x / SG: x / pH: x  Gluc: 89 mg/dL / Ketone: x  / Bili: x / Urobili: x   Blood: x / Protein: x / Nitrite: x   Leuk Esterase: x / RBC: x / WBC x   Sq Epi: x / Non Sq Epi: x / Bacteria: x      Magnesium: 1.8 mg/dL (12-01 @ 05:00)  Magnesium: 2.2 mg/dL (11-30 @ 13:45)      RADIOLOGY & ADDITIONAL TESTS:  < from: Xray Chest 1 View- PORTABLE-Routine (11.30.23 @ 05:51) >    ACC: [id number] EXAM:  XR CHEST PORTABLE ROUTINE 1V   ORDERED BY: JR ISAK MERCEDES     ACC: [id number] EXAM:  XR CHEST PORTABLE ROUTINE 1V   ORDERED BY: ELIZA BORGES     ACC: [id number] EXAM:  XR CHEST PORTABLE URGENT 1V   ORDERED BY: ELIZA BORGES     PROCEDURE DATE:  11/29/2023          INTERPRETATION:  TIME OF EXAM: November 29, 2023 at 4:47 PM.    CLINICAL INFORMATION: OR.    COMPARISON:  November 10, 2023.    TECHNIQUE:   AP Portable chest x-ray. External cardiac pacer pad projects  over and obscures part of the left upper image.    INTERPRETATION:    Heart size and the mediastinum cannot be accurately evaluated on this   projection. Calcified mitral valve annulus. Status post TAVR.  A rounded calcification projecting over themedial right upper chest   corresponds to right brachiocephalic artery calcification on a recent CT   scan of the chest.  There is an IVC approach TVP with tip in the expected region of the right   ventricle.  Early elevated right hemidiaphragm againseen.  There is mild central pulmonary vascular congestion, predominantly on the   left.  No focal lung consolidation is seen.  No pleural effusion or pneumothorax is seen.  There is osteoarthritic degenerative change of the spine.  There are right upper quadrant abdominal surgical clips.    AP portable chest x-ray from November 29, 2023 at 5:58 PM:    CLINICAL INFORMATION: Post cardiac surgery.    INTERPRETATION:    IVC approach TVP is unchanged in position.  Other findings are not significantlychanged.    AP portable chest x-ray from November 30, 2023 at 5:03 AM:    CLINICAL INFORMATION: Post cardiac surgery.    INTERPRETATION:    The TVP is no longer seen.  Continued mild central pulmonary vascular congestion, predominantly on   the left.  No new bibasilar opacities, greater on the left.  Question minimal left apical pneumothorax versus a skinfold.      IMPRESSION:  Mild central pulmonary vascular congestion, greater on the   left, not significantly changed.    New bibasilar opacities, greater on the left, the most recent image,   which can be due to small pleural effusions with associated passive   atelectasis, atelectasis of other cause, and/or pneumonia in the   appropriate clinical context.    Question minimal left apical pneumothorax versus a skinfold on the most   recent image.    < end of copied text >

## 2023-12-02 VITALS
TEMPERATURE: 99 F | SYSTOLIC BLOOD PRESSURE: 148 MMHG | OXYGEN SATURATION: 95 % | DIASTOLIC BLOOD PRESSURE: 70 MMHG | HEART RATE: 72 BPM | RESPIRATION RATE: 18 BRPM

## 2023-12-02 LAB
ANION GAP SERPL CALC-SCNC: 12 MMOL/L — SIGNIFICANT CHANGE UP (ref 5–17)
ANION GAP SERPL CALC-SCNC: 12 MMOL/L — SIGNIFICANT CHANGE UP (ref 5–17)
BUN SERPL-MCNC: 37.9 MG/DL — HIGH (ref 8–20)
BUN SERPL-MCNC: 37.9 MG/DL — HIGH (ref 8–20)
CALCIUM SERPL-MCNC: 8.5 MG/DL — SIGNIFICANT CHANGE UP (ref 8.4–10.5)
CALCIUM SERPL-MCNC: 8.5 MG/DL — SIGNIFICANT CHANGE UP (ref 8.4–10.5)
CHLORIDE SERPL-SCNC: 104 MMOL/L — SIGNIFICANT CHANGE UP (ref 96–108)
CHLORIDE SERPL-SCNC: 104 MMOL/L — SIGNIFICANT CHANGE UP (ref 96–108)
CO2 SERPL-SCNC: 23 MMOL/L — SIGNIFICANT CHANGE UP (ref 22–29)
CO2 SERPL-SCNC: 23 MMOL/L — SIGNIFICANT CHANGE UP (ref 22–29)
CREAT SERPL-MCNC: 1.83 MG/DL — HIGH (ref 0.5–1.3)
CREAT SERPL-MCNC: 1.83 MG/DL — HIGH (ref 0.5–1.3)
EGFR: 28 ML/MIN/1.73M2 — LOW
EGFR: 28 ML/MIN/1.73M2 — LOW
FERRITIN SERPL-MCNC: 124 NG/ML — SIGNIFICANT CHANGE UP (ref 13–330)
FERRITIN SERPL-MCNC: 124 NG/ML — SIGNIFICANT CHANGE UP (ref 13–330)
GLUCOSE SERPL-MCNC: 111 MG/DL — HIGH (ref 70–99)
GLUCOSE SERPL-MCNC: 111 MG/DL — HIGH (ref 70–99)
HCT VFR BLD CALC: 29.5 % — LOW (ref 34.5–45)
HCT VFR BLD CALC: 29.5 % — LOW (ref 34.5–45)
HGB BLD-MCNC: 9.7 G/DL — LOW (ref 11.5–15.5)
HGB BLD-MCNC: 9.7 G/DL — LOW (ref 11.5–15.5)
IRON SATN MFR SERPL: 15 % — SIGNIFICANT CHANGE UP (ref 14–50)
IRON SATN MFR SERPL: 15 % — SIGNIFICANT CHANGE UP (ref 14–50)
IRON SATN MFR SERPL: 42 UG/DL — SIGNIFICANT CHANGE UP (ref 37–145)
IRON SATN MFR SERPL: 42 UG/DL — SIGNIFICANT CHANGE UP (ref 37–145)
MAGNESIUM SERPL-MCNC: 1.7 MG/DL — SIGNIFICANT CHANGE UP (ref 1.6–2.6)
MAGNESIUM SERPL-MCNC: 1.7 MG/DL — SIGNIFICANT CHANGE UP (ref 1.6–2.6)
MCHC RBC-ENTMCNC: 28 PG — SIGNIFICANT CHANGE UP (ref 27–34)
MCHC RBC-ENTMCNC: 28 PG — SIGNIFICANT CHANGE UP (ref 27–34)
MCHC RBC-ENTMCNC: 32.9 GM/DL — SIGNIFICANT CHANGE UP (ref 32–36)
MCHC RBC-ENTMCNC: 32.9 GM/DL — SIGNIFICANT CHANGE UP (ref 32–36)
MCV RBC AUTO: 85.3 FL — SIGNIFICANT CHANGE UP (ref 80–100)
MCV RBC AUTO: 85.3 FL — SIGNIFICANT CHANGE UP (ref 80–100)
PHOSPHATE SERPL-MCNC: 1.9 MG/DL — LOW (ref 2.4–4.7)
PHOSPHATE SERPL-MCNC: 1.9 MG/DL — LOW (ref 2.4–4.7)
PLATELET # BLD AUTO: 234 K/UL — SIGNIFICANT CHANGE UP (ref 150–400)
PLATELET # BLD AUTO: 234 K/UL — SIGNIFICANT CHANGE UP (ref 150–400)
POTASSIUM SERPL-MCNC: 4.2 MMOL/L — SIGNIFICANT CHANGE UP (ref 3.5–5.3)
POTASSIUM SERPL-MCNC: 4.2 MMOL/L — SIGNIFICANT CHANGE UP (ref 3.5–5.3)
POTASSIUM SERPL-SCNC: 4.2 MMOL/L — SIGNIFICANT CHANGE UP (ref 3.5–5.3)
POTASSIUM SERPL-SCNC: 4.2 MMOL/L — SIGNIFICANT CHANGE UP (ref 3.5–5.3)
RBC # BLD: 3.46 M/UL — LOW (ref 3.8–5.2)
RBC # BLD: 3.46 M/UL — LOW (ref 3.8–5.2)
RBC # FLD: 14.6 % — HIGH (ref 10.3–14.5)
RBC # FLD: 14.6 % — HIGH (ref 10.3–14.5)
SODIUM SERPL-SCNC: 139 MMOL/L — SIGNIFICANT CHANGE UP (ref 135–145)
SODIUM SERPL-SCNC: 139 MMOL/L — SIGNIFICANT CHANGE UP (ref 135–145)
TIBC SERPL-MCNC: 287 UG/DL — SIGNIFICANT CHANGE UP (ref 220–430)
TIBC SERPL-MCNC: 287 UG/DL — SIGNIFICANT CHANGE UP (ref 220–430)
TRANSFERRIN SERPL-MCNC: 201 MG/DL — SIGNIFICANT CHANGE UP (ref 192–382)
TRANSFERRIN SERPL-MCNC: 201 MG/DL — SIGNIFICANT CHANGE UP (ref 192–382)
WBC # BLD: 8.38 K/UL — SIGNIFICANT CHANGE UP (ref 3.8–10.5)
WBC # BLD: 8.38 K/UL — SIGNIFICANT CHANGE UP (ref 3.8–10.5)
WBC # FLD AUTO: 8.38 K/UL — SIGNIFICANT CHANGE UP (ref 3.8–10.5)
WBC # FLD AUTO: 8.38 K/UL — SIGNIFICANT CHANGE UP (ref 3.8–10.5)

## 2023-12-02 PROCEDURE — 71045 X-RAY EXAM CHEST 1 VIEW: CPT | Mod: 26

## 2023-12-02 PROCEDURE — 99232 SBSQ HOSP IP/OBS MODERATE 35: CPT

## 2023-12-02 RX ORDER — NIFEDIPINE 30 MG
1 TABLET, EXTENDED RELEASE 24 HR ORAL
Refills: 0 | DISCHARGE

## 2023-12-02 RX ORDER — CLOPIDOGREL BISULFATE 75 MG/1
1 TABLET, FILM COATED ORAL
Refills: 0 | DISCHARGE

## 2023-12-02 RX ORDER — APIXABAN 2.5 MG/1
1 TABLET, FILM COATED ORAL
Qty: 60 | Refills: 1
Start: 2023-12-02 | End: 2024-01-30

## 2023-12-02 RX ORDER — METOPROLOL TARTRATE 50 MG
25 TABLET ORAL DAILY
Refills: 0 | Status: DISCONTINUED | OUTPATIENT
Start: 2023-12-02 | End: 2023-12-02

## 2023-12-02 RX ORDER — SAXAGLIPTIN AND METFORMIN HYDROCHLORIDE 1000; 5 MG/1; MG/1
1 TABLET, FILM COATED, EXTENDED RELEASE ORAL
Qty: 0 | Refills: 0 | DISCHARGE

## 2023-12-02 RX ORDER — APIXABAN 2.5 MG/1
2.5 TABLET, FILM COATED ORAL
Refills: 0 | Status: DISCONTINUED | OUTPATIENT
Start: 2023-12-02 | End: 2023-12-02

## 2023-12-02 RX ORDER — APIXABAN 2.5 MG/1
1 TABLET, FILM COATED ORAL
Refills: 0 | DISCHARGE

## 2023-12-02 RX ORDER — MAGNESIUM OXIDE 400 MG ORAL TABLET 241.3 MG
800 TABLET ORAL ONCE
Refills: 0 | Status: COMPLETED | OUTPATIENT
Start: 2023-12-02 | End: 2023-12-02

## 2023-12-02 RX ADMIN — SODIUM CHLORIDE 3 MILLILITER(S): 9 INJECTION INTRAMUSCULAR; INTRAVENOUS; SUBCUTANEOUS at 12:50

## 2023-12-02 RX ADMIN — Medication 325 MILLIGRAM(S): at 09:54

## 2023-12-02 RX ADMIN — MAGNESIUM OXIDE 400 MG ORAL TABLET 800 MILLIGRAM(S): 241.3 TABLET ORAL at 07:24

## 2023-12-02 RX ADMIN — Medication 81 MILLIGRAM(S): at 09:54

## 2023-12-02 RX ADMIN — APIXABAN 2.5 MILLIGRAM(S): 2.5 TABLET, FILM COATED ORAL at 09:53

## 2023-12-02 RX ADMIN — Medication 75 MICROGRAM(S): at 06:30

## 2023-12-02 RX ADMIN — POLYETHYLENE GLYCOL 3350 17 GRAM(S): 17 POWDER, FOR SOLUTION ORAL at 09:53

## 2023-12-02 RX ADMIN — Medication 25 MILLIGRAM(S): at 12:52

## 2023-12-02 RX ADMIN — PANTOPRAZOLE SODIUM 40 MILLIGRAM(S): 20 TABLET, DELAYED RELEASE ORAL at 09:53

## 2023-12-02 RX ADMIN — SODIUM CHLORIDE 3 MILLILITER(S): 9 INJECTION INTRAMUSCULAR; INTRAVENOUS; SUBCUTANEOUS at 06:22

## 2023-12-02 RX ADMIN — GABAPENTIN 300 MILLIGRAM(S): 400 CAPSULE ORAL at 06:30

## 2023-12-02 NOTE — PROGRESS NOTE ADULT - PROBLEM SELECTOR PROBLEM 1
01-Nov-2020 09:00
Nonrheumatic aortic valve stenosis

## 2023-12-02 NOTE — PROGRESS NOTE ADULT - ASSESSMENT
CKD(III): RAMÍREZ post TAVR   RAMÍREZ resolving ==>  renal hypoperfusion (low BP/ATN, hypovolemia)  - avoid potential nephrotoxins  - mendez  - trend labs    Anemia: +CKD component  - added RENATO  - awaiting Fe stores  - target Hgb > 10.0
78F, pmhx severe AS, Mod MR/TR, CAD/ICM (EF ~ 25% with mod RV Dysfunction in 6/2023), PCI to p&mLAD (9/1/23 with improvement in EF ~ 50% with NL RV 10/27/23 TTE), Chronic Combined Systolic & Diastolic CHF, HTN, HLD, RBBB, DVT/PE (on Eliquis), PAD (Infrarenal Stenosis > 50%), Former Smoker, Left Breast Ca s/p Lumpectomy / Radiation / Hormone (2006), Covid Infection, Stage 4 CKD, Left Renal Cyst, DM (A1C 5.5), Cholecystectomy, Diverticulosis, Dilated Appendix (? Mucocele-pt aware), Thyroid Nodules, Hypothyroid, T12 Compression Fx (wears TLSO brace), Lumbar Laminectomy with CBP, Skin Ca (Melanoma & Basal Cell) now s/p percutaneous TF-TAVR via RC[facility] (cathy) 11/29/23 with Dr. Shelton. Postop course notable for RAMÍREZ, mendez placed, renal consulted, improving with hydration

## 2023-12-02 NOTE — PROGRESS NOTE ADULT - PROBLEM SELECTOR PLAN 1
s/p TF TAVR 11/29/23  oob, ambulate as tolerated  encourage Incentive Spirometry  tolerating diet  hold home anti HTN due to RAMÍREZ   no rhythm/conduction issues  cont asa  no further need for plavix per Dr. Nikita bauer to start later today  cont supportive care, needs to remain in hospital for close Cr, strict I/O monitoring  Plan to be discussed with Dr. Shelton in AM rounds.
s/p TF TAVR 11/29/23  oob, ambulate as tolerated  encourage Incentive Spirometry  tolerating diet  hold home anti HTN due to RAMÍREZ and borderline BPs  no rhythm/conduction issues  cont asa  no further need for plavix per Dr. Nikita bauer to start sat  cont supportive care, needs to remain in hospital for close Cr, strict I/O monitoring
s/p TF TAVR 11/29/23  oob, ambulate as tolerated  encourage Incentive Spirometry  tolerating diet  hold home anti HTN due to RAMÍREZ and borderline BPs  no rhythm/conduction issues  cont asa  no further need for plavix per Dr. Nikita bauer to start sat  cont supportive care, needs to remain in hospital for close Cr, strict I/O monitoring  Plan to be discussed with Dr. Shelton in AM rounds.

## 2023-12-02 NOTE — PROGRESS NOTE ADULT - SUBJECTIVE AND OBJECTIVE BOX
[facility], [facility]                              [address] [place] [address]                                                 PHONE: [phone]                                                 FAX: [phone]  -----------------------------------------------------------------------------------------------  Pt seen and examined. FU for  AS    Overnight events/Complaints: Pt without complains. s/p TF TAVR 11/29/23. Noted to have ARF. Now s/p Stephon.    Vital Signs Last 24 Hrs  T(C): 36.5 (30 Nov 2023 12:15), Max: 36.7 (29 Nov 2023 19:30)  T(F): 97.7 (30 Nov 2023 12:15), Max: 98.1 (30 Nov 2023 08:05)  HR: 72 (30 Nov 2023 12:15) (58 - 72)  BP: 106/59 (30 Nov 2023 12:15) (99/51 - 145/50)  BP(mean): 74 (30 Nov 2023 12:15) (65 - 78)  RR: 15 (30 Nov 2023 12:15) (11 - 20)  SpO2: 96% (30 Nov 2023 12:15) (93% - 100%)    Parameters below as of 30 Nov 2023 12:15  Patient On (Oxygen Delivery Method): room air      I&O's Summary    29 Nov 2023 07:01  -  30 Nov 2023 07:00  --------------------------------------------------------  IN: 250 mL / OUT: 0 mL / NET: 250 mL    30 Nov 2023 07:01  -  30 Nov 2023 15:55  --------------------------------------------------------  IN: 0 mL / OUT: 700 mL / NET: -700 mL    RELEVANT PHYSICAL EXAM:  Neck: No obvious JVD  Cardiovascular: regular S1, S2  Respiratory: Lungs clear to auscultation; no crepitations, no wheeze  Musculoskeletal: No edema        LABS:                        9.5    9.39  )-----------( 292      ( 30 Nov 2023 01:32 )             27.8     11-30    134<L>  |  96  |  75.8<H>  ----------------------------<  114<H>  4.8   |  24.0  |  4.02<H>    Ca    8.6      30 Nov 2023 13:45  Mg     2.2     11-30    TPro  6.4<L>  /  Alb  3.1<L>  /  TBili  0.3<L>  /  DBili  x   /  AST  19  /  ALT  8   /  AlkPhos  66  11-29    CARDIAC MARKERS ( 30 Nov 2023 01:32 )  x     / x     / 65 U/L / x     / x      CARDIAC MARKERS ( 29 Nov 2023 17:52 )  x     / x     / 60 U/L / x     / x          PT/INR - ( 30 Nov 2023 01:32 )   PT: 16.4 sec;   INR: 1.49 ratio         PTT - ( 30 Nov 2023 01:32 )  PTT:44.3 sec    RADIOLOGY & ADDITIONAL STUDIES: (reviewed)  CXR was independently visualized/reviewed  and demonstrated: cardiomegaly    CARDIOLOGY TESTING:(reviewed)     12 lead EKG independently visualized/reviewed  and demonstrated NSR with RBBB    ECHOCARDIOGRAM independently visualized/reviewed and demonstrated :    1. Left ventricular ejection fraction, by visual estimation, is 60 to   65%.   2. Normal global left ventricular systolic function.   3. Spectral Doppler shows pseudonormal pattern of left ventricular   myocardial filling (Grade II diastolic dysfunction).   4. Mild mitral valve regurgitation.   5. A 23mm Davey Trevor TAVR is visualized in the aortic position. Low   seated. Appears deep into LVOT.   6. AT=73.94msec.   7. Estimated pulmonary artery systolic pressure is 43.2 mmHg assuming a   right atrial pressure of 3 mmHg, which is consistent with mild pulmonary   hypertension.   8. The Dimesionless Index value is .74.    TELEMETRY independently visualized/reviewed and demonstrated : NSR with RBBB    MEDICATIONS:(reviewed)  MEDICATIONS  (STANDING):  aspirin enteric coated 81 milliGRAM(s) Oral daily  atorvastatin 40 milliGRAM(s) Oral at bedtime  dextrose 5%. 1000 milliLiter(s) (50 mL/Hr) IV Continuous <Continuous>  dextrose 5%. 1000 milliLiter(s) (100 mL/Hr) IV Continuous <Continuous>  dextrose 50% Injectable 12.5 Gram(s) IV Push once  dextrose 50% Injectable 25 Gram(s) IV Push once  dextrose 50% Injectable 25 Gram(s) IV Push once  ferrous    sulfate 325 milliGRAM(s) Oral daily  gabapentin 300 milliGRAM(s) Oral two times a day  glucagon  Injectable 1 milliGRAM(s) IntraMuscular once  insulin lispro (ADMELOG) corrective regimen sliding scale   SubCutaneous Before meals and at bedtime  levothyroxine 75 MICROGram(s) Oral daily  loratadine 10 milliGRAM(s) Oral daily  pantoprazole    Tablet 40 milliGRAM(s) Oral daily  polyethylene glycol 3350 17 Gram(s) Oral daily  sodium chloride 0.9% lock flush 3 milliLiter(s) IV Push every 8 hours  sodium chloride 0.9%. 500 milliLiter(s) (75 mL/Hr) IV Continuous <Continuous>  sodium chloride 0.9%. 1000 milliLiter(s) (10 mL/Hr) IV Continuous <Continuous>    ASSESSMENT AND PLAN:    78y Female with prior history of preDM, DVT and PE on Eliquis HTN, hypothyroidism, HLD, CAD s/p PCI 6/2023 on ASA and plavix s/p TF TAVR 11/29/23 with ARF     On ASA  Continue statin  prior AF on Eliquis  No arrhythmias on telemetry  Renal input requested    Plan discussed with CTS team      
Brief summary:  78yFemale s/p Percutaenous TF-TAVR via RCFA (Trevor). Patient in NAD with no complaints. Pt denies CP, palpitations, SOB, cough, fever, chills, itchiness/rash, diaphoresis, vision changes, HA, dizziness/lightheadedness, numbness/tingling, abd pain, N/V     Significant Overnight events/24 Hours: RAMÍREZ post TAVR, improvement with hydration      PAST MEDICAL & SURGICAL HISTORY:  Hypertension    Prediabetes    Breast cancer  2006, surgery, radiation and hormone therapy    Hypothyroidism    Hyperlipidemia    Skin cancer    COVID-19 vaccine series completed    Nonrheumatic aortic valve stenosis    H/O hyperkalemia    CAD S/P percutaneous coronary angioplasty    DVT (deep venous thrombosis)    Pulmonary embolism    Former smoker    H/O ankylosing spondylitis    History of cholecystectomy  1979    H/O total hysterectomy  1987    H/O laminectomy  lumbar 1992    History of lumpectomy  left 2006    S/P tonsillectomy      Medications:  aspirin enteric coated 81 milliGRAM(s) Oral daily  atorvastatin 40 milliGRAM(s) Oral at bedtime  dextrose 5%. 1000 milliLiter(s) IV Continuous <Continuous>  dextrose 5%. 1000 milliLiter(s) IV Continuous <Continuous>  dextrose 50% Injectable 25 Gram(s) IV Push once  dextrose 50% Injectable 25 Gram(s) IV Push once  dextrose 50% Injectable 12.5 Gram(s) IV Push once  dextrose Oral Gel 15 Gram(s) Oral once PRN  ferrous    sulfate 325 milliGRAM(s) Oral daily  gabapentin 300 milliGRAM(s) Oral two times a day  glucagon  Injectable 1 milliGRAM(s) IntraMuscular once  hydrALAZINE Injectable 5 milliGRAM(s) IV Push every 6 hours PRN  insulin lispro (ADMELOG) corrective regimen sliding scale   SubCutaneous Before meals and at bedtime  levothyroxine 75 MICROGram(s) Oral daily  loratadine 10 milliGRAM(s) Oral daily  pantoprazole    Tablet 40 milliGRAM(s) Oral daily  polyethylene glycol 3350 17 Gram(s) Oral daily  sodium chloride 0.9% lock flush 3 milliLiter(s) IV Push every 8 hours  sodium chloride 0.9%. 500 milliLiter(s) IV Continuous <Continuous>  sodium chloride 0.9%. 1000 milliLiter(s) IV Continuous <Continuous>      MEDICATIONS  (PRN):  dextrose Oral Gel 15 Gram(s) Oral once PRN Blood Glucose LESS THAN 70 milliGRAM(s)/deciliter  hydrALAZINE Injectable 5 milliGRAM(s) IV Push every 6 hours PRN SBP > 170      Daily Review:      ABG - ( 29 Nov 2023 17:59 )  pH, Arterial: 7.360 pH, Blood: x     /  pCO2: 42    /  pO2: 130   / HCO3: 24    / Base Excess: -1.7  /  SaO2: 100.0                  9.5    9.39  )-----------( 292      ( 30 Nov 2023 01:32 )             27.8   11-30    134<L>  |  96  |  75.8<H>  ----------------------------<  114<H>  4.8   |  24.0  |  4.02<H>    Ca    8.6      30 Nov 2023 13:45  Mg     2.2     11-30    TPro  6.4<L>  /  Alb  3.1<L>  /  TBili  0.3<L>  /  DBili  x   /  AST  19  /  ALT  8   /  AlkPhos  66  11-29    CARDIAC MARKERS ( 30 Nov 2023 01:32 )  x     / x     / 65 U/L / x     / x      CARDIAC MARKERS ( 29 Nov 2023 17:52 )  x     / x     / 60 U/L / x     / x        PT/INR - ( 30 Nov 2023 01:32 )   PT: 16.4 sec;   INR: 1.49 ratio         PTT - ( 30 Nov 2023 01:32 )  PTT:44.3 sec    T(C): 36.4 (12-01-23 @ 00:37), Max: 37.1 (11-30-23 @ 20:48)  HR: 86 (12-01-23 @ 00:37) (60 - 86)  BP: 153/69 (12-01-23 @ 00:37) (106/59 - 153/69)  RR: 18 (12-01-23 @ 00:37) (14 - 18)  SpO2: 95% (12-01-23 @ 00:37) (93% - 98%)  Wt(kg): --    CAPILLARY BLOOD GLUCOSE    POCT Blood Glucose.: 106 mg/dL (30 Nov 2023 21:25)  POCT Blood Glucose.: 91 mg/dL (30 Nov 2023 17:09)  POCT Blood Glucose.: 99 mg/dL (30 Nov 2023 11:51)  POCT Blood Glucose.: 80 mg/dL (30 Nov 2023 08:24)  POCT Blood Glucose.: 77 mg/dL (30 Nov 2023 07:58)      I&O's Summary    29 Nov 2023 07:01  -  30 Nov 2023 07:00  --------------------------------------------------------  IN: 250 mL / OUT: 0 mL / NET: 250 mL    30 Nov 2023 07:01  -  01 Dec 2023 01:56  --------------------------------------------------------  IN: 950 mL / OUT: 700 mL / NET: 250 mL      Physical Exam  General: NAD  Neuro: A&Ox3 non focal speech clear and intact  Pulm: CTA b/l no wheezing   CV: S1S2 RRR   Abd: +BS soft NT ND, +mendez in place  Extrem/MS: no edema/cyanosis, GOLD  Incision(s): b/l groins with dressings intact c/d, soft no hematoma
NEPHROLOGY INTERVAL HPI/OVERNIGHT EVENTS:  pt comfortable  no acute distress noted    MEDICATIONS  (STANDING):  apixaban 2.5 milliGRAM(s) Oral two times a day  aspirin enteric coated 81 milliGRAM(s) Oral daily  atorvastatin 40 milliGRAM(s) Oral at bedtime  dextrose 5%. 1000 milliLiter(s) (50 mL/Hr) IV Continuous <Continuous>  dextrose 5%. 1000 milliLiter(s) (100 mL/Hr) IV Continuous <Continuous>  dextrose 50% Injectable 25 Gram(s) IV Push once  dextrose 50% Injectable 25 Gram(s) IV Push once  dextrose 50% Injectable 12.5 Gram(s) IV Push once  epoetin cris-epbx (RETACRIT) Injectable 59330 Unit(s) SubCutaneous <User Schedule>  ferrous    sulfate 325 milliGRAM(s) Oral daily  gabapentin 300 milliGRAM(s) Oral two times a day  glucagon  Injectable 1 milliGRAM(s) IntraMuscular once  insulin lispro (ADMELOG) corrective regimen sliding scale   SubCutaneous Before meals and at bedtime  levothyroxine 75 MICROGram(s) Oral daily  loratadine 10 milliGRAM(s) Oral daily  pantoprazole    Tablet 40 milliGRAM(s) Oral daily  polyethylene glycol 3350 17 Gram(s) Oral daily  sodium chloride 0.9% lock flush 3 milliLiter(s) IV Push every 8 hours    MEDICATIONS  (PRN):  dextrose Oral Gel 15 Gram(s) Oral once PRN Blood Glucose LESS THAN 70 milliGRAM(s)/deciliter  hydrALAZINE Injectable 5 milliGRAM(s) IV Push every 6 hours PRN SBP > 170      Allergies    ACE inhibitors (Angioedema)  morphine (Hives; Nausea)        Vital Signs Last 24 Hrs  T(C): 36.7 (02 Dec 2023 08:05), Max: 36.9 (01 Dec 2023 19:48)  T(F): 98.1 (02 Dec 2023 08:05), Max: 98.4 (01 Dec 2023 19:48)  HR: 77 (02 Dec 2023 08:05) (63 - 87)  BP: 156/59 (02 Dec 2023 08:05) (134/69 - 165/88)  BP(mean): 9 (01 Dec 2023 16:30) (9 - 9)  RR: 17 (02 Dec 2023 08:05) (17 - 20)  SpO2: 97% (02 Dec 2023 05:04) (96% - 100%)    Parameters below as of 02 Dec 2023 08:05  Patient On (Oxygen Delivery Method): room air        PHYSICAL EXAM:  GENERAL: Deconditioned  HEENT: Moist Mms  NECK: Supple, No JVD  NERVOUS SYSTEM:  Alert & Oriented X3, intact and symmetric  CHEST/LUNG: Clear, diminished BS  HEART: Regular rate and rhythm; No rub  ABDOMEN: Soft, Nontender,  BS+  EXTREMITIES:  2+ Peripheral Pulses, No edema  SKIN: No rashes or lesions    LABS:                        9.7    8.38  )-----------( 234      ( 02 Dec 2023 04:57 )             29.5     12-02    139  |  104  |  37.9<H>  ----------------------------<  111<H>  4.2   |  23.0  |  1.83<H>        Ca    8.5      02 Dec 2023 04:57  Phos  1.9     12-02  Mg     1.7     12-02        Urinalysis Basic - ( 02 Dec 2023 04:57 )    Color: x / Appearance: x / SG: x / pH: x  Gluc: 111 mg/dL / Ketone: x  / Bili: x / Urobili: x   Blood: x / Protein: x / Nitrite: x   Leuk Esterase: x / RBC: x / WBC x   Sq Epi: x / Non Sq Epi: x / Bacteria: x      Phosphorus: 1.9 mg/dL (12-02 @ 04:57)  Magnesium: 1.7 mg/dL (12-02 @ 04:57)      RADIOLOGY & ADDITIONAL TESTS:  
  Subjective: no complaints denies CP, palpitations, SOB, cough, fever, chills, itchiness/rash, diaphoresis, vision changes, HA, dizziness/lightheadedness, numbness/tingling, abd pain, N/V     T(C): 36.5 (11-30-23 @ 12:15), Max: 36.7 (11-29-23 @ 19:30)  HR: 72 (11-30-23 @ 12:15) (58 - 72)  BP: 106/59 (11-30-23 @ 12:15) (99/51 - 145/50)  ABP: 120/40 (11-30-23 @ 04:00) (90/35 - 164/45)  ABP(mean): 65 (11-30-23 @ 04:00) (52 - 85)  RR: 15 (11-30-23 @ 12:15) (11 - 20)  SpO2: 96% (11-30-23 @ 12:15) (93% - 100%)    11-30    134<L>  |  96  |  75.8<H>  ----------------------------<  114<H>  4.8   |  24.0  |  4.02<H>    Ca    8.6      30 Nov 2023 13:45  Mg     2.2     11-30    TPro  6.4<L>  /  Alb  3.1<L>  /  TBili  0.3<L>  /  DBili  x   /  AST  19  /  ALT  8   /  AlkPhos  66  11-29                               9.5    9.39  )-----------( 292      ( 30 Nov 2023 01:32 )             27.8   PT/INR - ( 30 Nov 2023 01:32 )   PT: 16.4 sec;   INR: 1.49 ratio    PTT - ( 30 Nov 2023 01:32 )  PTT:44.3 sec  ABG - ( 29 Nov 2023 17:59 )  pH, Arterial: 7.360 pH, Blood: x     /  pCO2: 42    /  pO2: 130   / HCO3: 24    / Base Excess: -1.7  /  SaO2: 100.0               CAPILLARY BLOOD GLUCOSE  POCT Blood Glucose.: 99 mg/dL (30 Nov 2023 11:51)  POCT Blood Glucose.: 80 mg/dL (30 Nov 2023 08:24)  POCT Blood Glucose.: 77 mg/dL (30 Nov 2023 07:58)  POCT Blood Glucose.: 84 mg/dL (29 Nov 2023 21:45)    I&O's Detail    29 Nov 2023 07:01  -  30 Nov 2023 07:00  --------------------------------------------------------  IN:    Oral Fluid: 250 mL  Total IN: 250 mL    OUT:  Total OUT: 0 mL  Total NET: 250 mL    30 Nov 2023 07:01  -  30 Nov 2023 15:08  --------------------------------------------------------  IN:  Total IN: 0 mL    OUT:    Indwelling Catheter - Urethral (mL): 700 mL  Total OUT: 700 mL  Total NET: -700 mL    Drug Dosing Weight  Height (cm): 152.4 (29 Nov 2023 10:18)  Weight (kg): 61.099 (29 Nov 2023 10:18)  BMI (kg/m2): 26.3 (29 Nov 2023 10:18)  BSA (m2): 1.58 (29 Nov 2023 10:18)    MEDICATIONS  (STANDING):  aspirin enteric coated 81 milliGRAM(s) Oral daily  atorvastatin 40 milliGRAM(s) Oral at bedtime  dextrose 5%. 1000 milliLiter(s) (50 mL/Hr) IV Continuous <Continuous>  dextrose 5%. 1000 milliLiter(s) (100 mL/Hr) IV Continuous <Continuous>  dextrose 50% Injectable 25 Gram(s) IV Push once  dextrose 50% Injectable 25 Gram(s) IV Push once  dextrose 50% Injectable 12.5 Gram(s) IV Push once  ferrous    sulfate 325 milliGRAM(s) Oral daily  gabapentin 300 milliGRAM(s) Oral two times a day  glucagon  Injectable 1 milliGRAM(s) IntraMuscular once  insulin lispro (ADMELOG) corrective regimen sliding scale   SubCutaneous Before meals and at bedtime  levothyroxine 75 MICROGram(s) Oral daily  loratadine 10 milliGRAM(s) Oral daily  pantoprazole    Tablet 40 milliGRAM(s) Oral daily  polyethylene glycol 3350 17 Gram(s) Oral daily  sodium chloride 0.9% lock flush 3 milliLiter(s) IV Push every 8 hours  sodium chloride 0.9%. 500 milliLiter(s) (75 mL/Hr) IV Continuous <Continuous>  sodium chloride 0.9%. 1000 milliLiter(s) (10 mL/Hr) IV Continuous <Continuous>    MEDICATIONS  (PRN):  dextrose Oral Gel 15 Gram(s) Oral once PRN Blood Glucose LESS THAN 70 milliGRAM(s)/deciliter  hydrALAZINE Injectable 5 milliGRAM(s) IV Push every 6 hours PRN SBP > 170    Physical Exam  Gen: NAD  Neuro: A&Ox3 non focal speech clear and intact  Pulm: CTA b/l no wheezing   CV: S1S2 RRR   Abd: +BS soft NT ND  Extrem/MS: no edema/cyanosis, GOLD  Incision(s): b/l groins dsg intact, soft no hematoma      
ERNESTO JAY  MRN-[id number]    HPI:  77 y/o female presents today to PST pending TAVR with Dr. James Shelton on 11/29/23 secondary to nonrheumatic aortic valve stenosis. Pt. with history of preDM, DVT and PE on eliquis- denies bleeding or falls, HTN, hypothyroidism, HLD, CAD s/p PCI 6/2023 on ASA and plavix. Last fall Pt. states 3 weeks ago she found out she had a back fracture and has been wearing a back brace. Pt. states she was in the hospital in June 2023 and since then she has had an occasionally productive cough in the morning which lingers towards the afternoon, States she has seen a pulmonary dr. for this. Denies SOB, denies wheezing.  Pt. states she went to the hospital 6/2023 due to SOB, and had cardiac catheterization at that time, states she was told to have TAVR at that time, but that pt. states her daughter passed away 9/2023, she was her primary care taker so she could not handle having procedure at that time. Denies CP, denies dizziness or lightheadedness. History of breast cancer s/p lumpectomy on left s/p radiation. History of ankylosing spondylitis pt. states previously on remicaid and no longer on it since 4/2023 she states.  (10 Nov 2023 11:50)      Surgery/Hospital Course:  ·  PRE-OP DIAGNOSIS:  Severe aortic stenosis  Chronic combined systolic and diastolic heart failure, NYHA class 2   Right bundle branch block   ·  POST-OP DIAGNOSIS:  Severe aortic stenosis   Chronic combined systolic and diastolic heart failure, NYHA class 2   Right bundle branch block   ·  PROCEDURES:  TAVR, percutaneous 29-Nov-2023   Percutaneous Transfemoral TAVR via Right Common Femoral Artery (23mm Trevor Resilia)   Today:  No acute events -    ICU Vital Signs Last 24 Hrs  T(C): 36.7 (30 Nov 2023 08:05), Max: 36.7 (29 Nov 2023 19:30)  T(F): 98.1 (30 Nov 2023 08:05), Max: 98.1 (30 Nov 2023 08:05)  HR: 62 (30 Nov 2023 08:05) (58 - 64)  BP: 125/50 (30 Nov 2023 08:05) (99/51 - 145/50)  BP(mean): 66 (30 Nov 2023 05:34) (65 - 78)  ABP: 120/40 (30 Nov 2023 04:00) (90/35 - 164/45)  ABP(mean): 65 (30 Nov 2023 04:00) (52 - 85)  RR: 15 (30 Nov 2023 08:05) (11 - 20)  SpO2: 94% (30 Nov 2023 08:05) (93% - 100%)    O2 Parameters below as of 30 Nov 2023 08:05  Patient On (Oxygen Delivery Method): room air            Physical Exam:  Gen: A&O   CNS: non focal 	  Neck: no JVD  RES : clear , no wheezing              CVS: Regular  rhythm. Normal S1/S2  Abd: Soft, non-distended. Bowel sounds present.  Skin: No rash.  Ext:  no edema    ============================I/O===========================   I&O's Detail    29 Nov 2023 07:01  -  30 Nov 2023 07:00  --------------------------------------------------------  IN:    Oral Fluid: 250 mL  Total IN: 250 mL    OUT:  Total OUT: 0 mL    Total NET: 250 mL      30 Nov 2023 07:01  -  30 Nov 2023 12:10  --------------------------------------------------------  IN:  Total IN: 0 mL    OUT:    Indwelling Catheter - Urethral (mL): 700 mL  Total OUT: 700 mL    Total NET: -700 mL        ============================ LABS =========================                        9.5    9.39  )-----------( 292      ( 30 Nov 2023 01:32 )             27.8     11-30    135  |  96  |  80.6<H>  ----------------------------<  75  4.2   |  23.0  |  4.21<H>    Ca    8.0<L>      30 Nov 2023 01:32  Mg     1.7     11-30    TPro  6.4<L>  /  Alb  3.1<L>  /  TBili  0.3<L>  /  DBili  x   /  AST  19  /  ALT  8   /  AlkPhos  66  11-29    LIVER FUNCTIONS - ( 29 Nov 2023 17:52 )  Alb: 3.1 g/dL / Pro: 6.4 g/dL / ALK PHOS: 66 U/L / ALT: 8 U/L / AST: 19 U/L / GGT: x           PT/INR - ( 30 Nov 2023 01:32 )   PT: 16.4 sec;   INR: 1.49 ratio         PTT - ( 30 Nov 2023 01:32 )  PTT:44.3 sec  ABG - ( 29 Nov 2023 17:59 )  pH, Arterial: 7.360 pH, Blood: x     /  pCO2: 42    /  pO2: 130   / HCO3: 24    / Base Excess: -1.7  /  SaO2: 100.0             Urinalysis Basic - ( 30 Nov 2023 01:32 )    Color: x / Appearance: x / SG: x / pH: x  Gluc: 75 mg/dL / Ketone: x  / Bili: x / Urobili: x   Blood: x / Protein: x / Nitrite: x   Leuk Esterase: x / RBC: x / WBC x   Sq Epi: x / Non Sq Epi: x / Bacteria: x      ======================Micro/Rad/Cardio=================  Culture: Reviewed   CXR: Reviewed  Echo:Reviewed  ======================================================  PAST MEDICAL & SURGICAL HISTORY:  Hypertension      Prediabetes      Breast cancer  2006, surgery, radiation and hormone therapy      Hypothyroidism      Hyperlipidemia      Skin cancer      COVID-19 vaccine series completed      Nonrheumatic aortic valve stenosis      H/O hyperkalemia      CAD S/P percutaneous coronary angioplasty      DVT (deep venous thrombosis)      Pulmonary embolism      Former smoker      H/O ankylosing spondylitis      History of cholecystectomy  1979      H/O total hysterectomy  1987      H/O laminectomy  lumbar 1992      History of lumpectomy  left 2006      S/P tonsillectomy        ====================ASSESSMENT ==============  77 y/o female presents today to PST pending TAVR with Dr. James Shelton on 11/29/23 secondary to nonrheumatic aortic valve stenosis. Pt. with history of preDM, DVT and PE on eliquis- denies bleeding or falls, HTN, hypothyroidism, HLD, CAD s/p PCI 6/2023 on ASA and plavix. Last fall Pt. states 3 weeks ago she found out she had a back fracture and has been wearing a back brace. Pt. states she was in the hospital in June 2023 and since then she has had an occasionally productive cough in the morning which lingers towards the afternoon, States she has seen a pulmonary dr. for this. Denies SOB, denies wheezing.  Pt. states she went to the hospital 6/2023 due to SOB, and had cardiac catheterization at that time, states she was told to have TAVR at that time, but that pt. states her daughter passed away 9/2023, she was her primary care taker so she could not handle having procedure at that time. Denies CP, denies dizziness or lightheadedness. History of breast cancer s/p lumpectomy on left s/p radiation. History of ankylosing spondylitis pt. states previously on remicaid and no longer on it since 4/2023 she states.  (10 Nov 2023 11:50)    ---Severe aortic stenosis   ---Chronic combined systolic and diastolic heart failure, NYHA class 2   ---Right bundle branch block   ---S/p TAVR, percutaneous 29-Nov-2023              Percutaneous Transfemoral TAVR via Right Common Femoral Artery (23mm Trevor Resilia)              Commercial 23mm Davey Trevor Resilia Percutaneous Transfemoral TAVR via right Common Femoral Artery.-  ---Post op Hypovolemia  ---Post op respiratory insufficiency       Plan:-    - Renal consult  ====================== NEUROLOGY=====================  gabapentin 300 milliGRAM(s) Oral two times a day    ==================== RESPIRATORY======================  Post op respiratory insufficiency  loratadine 10 milliGRAM(s) Oral daily    ====================CARDIOVASCULAR==================  Post op Hypovolemia  hydrALAZINE Injectable 5 milliGRAM(s) IV Push every 6 hours PRN SBP > 170    ===================HEMATOLOGIC/ONC ===================  Monitor H&H/Plts    aspirin enteric coated 81 milliGRAM(s) Oral daily    ===================== RENAL =========================  Continue monitoring urine output, I&OS, BUN/Cr     ==================== GASTROINTESTINAL===================  dextrose 5%. 1000 milliLiter(s) (50 mL/Hr) IV Continuous <Continuous>  dextrose 5%. 1000 milliLiter(s) (100 mL/Hr) IV Continuous <Continuous>  ferrous    sulfate 325 milliGRAM(s) Oral daily  pantoprazole    Tablet 40 milliGRAM(s) Oral daily  polyethylene glycol 3350 17 Gram(s) Oral daily  sodium chloride 0.9% lock flush 3 milliLiter(s) IV Push every 8 hours  sodium chloride 0.9%. 500 milliLiter(s) (75 mL/Hr) IV Continuous <Continuous>  sodium chloride 0.9%. 1000 milliLiter(s) (10 mL/Hr) IV Continuous <Continuous>    =======================    ENDOCRINE  =====================  atorvastatin 40 milliGRAM(s) Oral at bedtime  dextrose 50% Injectable 25 Gram(s) IV Push once  dextrose 50% Injectable 25 Gram(s) IV Push once  dextrose 50% Injectable 12.5 Gram(s) IV Push once  dextrose Oral Gel 15 Gram(s) Oral once PRN Blood Glucose LESS THAN 70 milliGRAM(s)/deciliter  glucagon  Injectable 1 milliGRAM(s) IntraMuscular once  insulin lispro (ADMELOG) corrective regimen sliding scale   SubCutaneous Before meals and at bedtime  levothyroxine 75 MICROGram(s) Oral daily    ========================INFECTIOUS DISEASE================      -Monitor Neurologic status ,   -Head of the bed should remain elevated to 45 degrees,  -Monitor for arrhythmias and monitor parameters for organ perfusion,  -Glycemic control is satisfactory,  -Nutritional goals will be met using po eventually , insure adequate caloric intake and monitor the same ,  -Electrolytes have been repleted as necessary , pain control has been achieved  and wound care has been carried out ,  -Stress ulcer and VTE prophylaxis will be achieved,  -Agressive PT and early mobility and ambulation goals will be met,    I have spent 35 minutes providing acute care for this critically ill patient     Patient requires continuous monitoring with bedside rhythm monitoring, pulse ox monitoring, and intermittent blood gas analysis. Care plan discussed with ICU care team. Patient remained critical and at risk for life threatening decompensation.           
ERNESTO JAY  MRN-[id number]    HPI:  79 y/o female presents today to PST pending TAVR with Dr. James Shelton on 11/29/23 secondary to nonrheumatic aortic valve stenosis. Pt. with history of preDM, DVT and PE on eliquis- denies bleeding or falls, HTN, hypothyroidism, HLD, CAD s/p PCI 6/2023 on ASA and plavix. Last fall Pt. states 3 weeks ago she found out she had a back fracture and has been wearing a back brace. Pt. states she was in the hospital in June 2023 and since then she has had an occasionally productive cough in the morning which lingers towards the afternoon, States she has seen a pulmonary dr. for this. Denies SOB, denies wheezing.  Pt. states she went to the hospital 6/2023 due to SOB, and had cardiac catheterization at that time, states she was told to have TAVR at that time, but that pt. states her daughter passed away 9/2023, she was her primary care taker so she could not handle having procedure at that time. Denies CP, denies dizziness or lightheadedness. History of breast cancer s/p lumpectomy on left s/p radiation. History of ankylosing spondylitis pt. states previously on remicaid and no longer on it since 4/2023 she states.  (10 Nov 2023 11:50)      Surgery/Hospital Course:  ·  PRE-OP DIAGNOSIS:  Severe aortic stenosis  Chronic combined systolic and diastolic heart failure, NYHA class 2   Right bundle branch block   ·  POST-OP DIAGNOSIS:  Severe aortic stenosis   Chronic combined systolic and diastolic heart failure, NYHA class 2   Right bundle branch block   ·  PROCEDURES:  TAVR, percutaneous 29-Nov-2023   Percutaneous Transfemoral TAVR via Right Common Femoral Artery (23mm Trevor Resilia)   Today:  No acute events     ICU Vital Signs Last 24 Hrs  T(C): 36.4 (29 Nov 2023 18:00), Max: 36.5 (29 Nov 2023 10:18)  T(F): 97.6 (29 Nov 2023 18:00), Max: 97.7 (29 Nov 2023 10:18)  HR: 60 (29 Nov 2023 18:30) (58 - 65)  BP: 137/54 (29 Nov 2023 18:30) (116/50 - 139/45)  BP(mean): 77 (29 Nov 2023 18:30) (67 - 77)  ABP: --  ABP(mean): --  RR: 16 (29 Nov 2023 10:18) (16 - 16)  SpO2: 100% (29 Nov 2023 10:18) (100% - 100%)        Physical Exam:  Gen: A&O   CNS: non focal 	  Neck: no JVD  RES : clear , no wheezing              CVS: Regular  rhythm. Normal S1/S2  Abd: Soft, non-distended. Bowel sounds present.  Skin: No rash.  Ext:  no edema    ============================I/O===========================   I&O's Detail    ============================ LABS =========================                        9.7    8.67  )-----------( 283      ( 29 Nov 2023 17:52 )             28.0     11-29    138  |  99  |  82.5<H>  ----------------------------<  88  4.4   |  22.0  |  4.38<H>    Ca    8.3<L>      29 Nov 2023 17:52  Mg     1.6     11-29    TPro  6.4<L>  /  Alb  3.1<L>  /  TBili  0.3<L>  /  DBili  x   /  AST  19  /  ALT  8   /  AlkPhos  66  11-29    LIVER FUNCTIONS - ( 29 Nov 2023 17:52 )  Alb: 3.1 g/dL / Pro: 6.4 g/dL / ALK PHOS: 66 U/L / ALT: 8 U/L / AST: 19 U/L / GGT: x           PT/INR - ( 29 Nov 2023 17:52 )   PT: 21.3 sec;   INR: 1.96 ratio         PTT - ( 29 Nov 2023 17:52 )  PTT:43.0 sec  ABG - ( 29 Nov 2023 17:59 )  pH, Arterial: 7.360 pH, Blood: x     /  pCO2: 42    /  pO2: 130   / HCO3: 24    / Base Excess: -1.7  /  SaO2: 100.0             Urinalysis Basic - ( 29 Nov 2023 17:52 )    Color: x / Appearance: x / SG: x / pH: x  Gluc: 88 mg/dL / Ketone: x  / Bili: x / Urobili: x   Blood: x / Protein: x / Nitrite: x   Leuk Esterase: x / RBC: x / WBC x   Sq Epi: x / Non Sq Epi: x / Bacteria: x      ======================Micro/Rad/Cardio=================  Culture: Reviewed   CXR: Reviewed  Echo:Reviewed  ======================================================  PAST MEDICAL & SURGICAL HISTORY:  Hypertension      Prediabetes      Breast cancer  2006, surgery, radiation and hormone therapy      Hypothyroidism      Hyperlipidemia      Skin cancer      COVID-19 vaccine series completed      Nonrheumatic aortic valve stenosis      H/O hyperkalemia      CAD S/P percutaneous coronary angioplasty      DVT (deep venous thrombosis)      Pulmonary embolism      Former smoker      H/O ankylosing spondylitis      History of cholecystectomy  1979      H/O total hysterectomy  1987      H/O laminectomy  lumbar 1992      History of lumpectomy  left 2006      S/P tonsillectomy        ====================ASSESSMENT ==============  79 y/o female presents today to PST pending TAVR with Dr. James Shelton on 11/29/23 secondary to nonrheumatic aortic valve stenosis. Pt. with history of preDM, DVT and PE on eliquis- denies bleeding or falls, HTN, hypothyroidism, HLD, CAD s/p PCI 6/2023 on ASA and plavix. Last fall Pt. states 3 weeks ago she found out she had a back fracture and has been wearing a back brace. Pt. states she was in the hospital in June 2023 and since then she has had an occasionally productive cough in the morning which lingers towards the afternoon, States she has seen a pulmonary dr. for this. Denies SOB, denies wheezing.  Pt. states she went to the hospital 6/2023 due to SOB, and had cardiac catheterization at that time, states she was told to have TAVR at that time, but that pt. states her daughter passed away 9/2023, she was her primary care taker so she could not handle having procedure at that time. Denies CP, denies dizziness or lightheadedness. History of breast cancer s/p lumpectomy on left s/p radiation. History of ankylosing spondylitis pt. states previously on remicaid and no longer on it since 4/2023 she states.  (10 Nov 2023 11:50)    ---Severe aortic stenosis   ---Chronic combined systolic and diastolic heart failure, NYHA class 2   ---Right bundle branch block   ---S/p TAVR, percutaneous 29-Nov-2023              Percutaneous Transfemoral TAVR via Right Common Femoral Artery (23mm Trevor Resilia)              Commercial 23mm Davey Trevor Resilia Percutaneous Transfemoral TAVR via right Common Femoral Artery.-  ---Post op Hypovolemia  ---Post op respiratory insufficiency       Plan:  ====================== NEUROLOGY=====================  gabapentin 300 milliGRAM(s) Oral two times a day    ==================== RESPIRATORY======================  Post op respiratory insufficiency  loratadine 10 milliGRAM(s) Oral daily    ====================CARDIOVASCULAR==================  Post op Hypovolemia  hydrALAZINE Injectable 5 milliGRAM(s) IV Push every 6 hours PRN SBP > 170    ===================HEMATOLOGIC/ONC ===================  Monitor H&H/Plts      ===================== RENAL =========================  Continue monitoring urine output, I&OS, BUN/Cr     ==================== GASTROINTESTINAL===================  dextrose 5%. 1000 milliLiter(s) (50 mL/Hr) IV Continuous <Continuous>  dextrose 5%. 1000 milliLiter(s) (100 mL/Hr) IV Continuous <Continuous>  ferrous    sulfate 325 milliGRAM(s) Oral daily  polyethylene glycol 3350 17 Gram(s) Oral daily  potassium chloride  10 mEq/50 mL IVPB 10 milliEquivalent(s) IV Intermittent every 1 hour  potassium chloride  10 mEq/50 mL IVPB 10 milliEquivalent(s) IV Intermittent every 1 hour  potassium chloride  10 mEq/50 mL IVPB 10 milliEquivalent(s) IV Intermittent every 1 hour  sodium chloride 0.9% lock flush 3 milliLiter(s) IV Push every 8 hours  sodium chloride 0.9%. 1000 milliLiter(s) (10 mL/Hr) IV Continuous <Continuous>    =======================    ENDOCRINE  =====================  atorvastatin 40 milliGRAM(s) Oral at bedtime  dextrose 50% Injectable 25 Gram(s) IV Push once  dextrose 50% Injectable 25 Gram(s) IV Push once  dextrose 50% Injectable 12.5 Gram(s) IV Push once  dextrose Oral Gel 15 Gram(s) Oral once PRN Blood Glucose LESS THAN 70 milliGRAM(s)/deciliter  glucagon  Injectable 1 milliGRAM(s) IntraMuscular once  insulin lispro (ADMELOG) corrective regimen sliding scale   SubCutaneous Before meals and at bedtime  levothyroxine 75 MICROGram(s) Oral daily    ========================INFECTIOUS DISEASE================  cefuroxime  IVPB 1500 milliGRAM(s) IV Intermittent once      -Monitor Neurologic status ,   -Head of the bed should remain elevated to 45 degrees,  -Monitor for arrhythmias and monitor parameters for organ perfusion,  -Glycemic control is satisfactory,  -Nutritional goals will be met using po eventually , insure adequate caloric intake and monitor the same ,  -Electrolytes have been repleted as necessary , pain control has been achieved  and wound care has been carried out ,  -Stress ulcer and VTE prophylaxis will be achieved,  -Agressive PT and early mobility and ambulation goals will be met,    I have spent 35 minutes providing acute care for this critically ill patient     Patient requires continuous monitoring with bedside rhythm monitoring, pulse ox monitoring, and intermittent blood gas analysis. Care plan discussed with ICU care team. Patient remained critical and at risk for life threatening decompensation.           
                [facility]                                   [address] [place] [address]                                                      PHONE: [phone]                                                         FAX: [phone]  [email or web address]  ---------------------------------------------------------------------------------------------------------------------------------    Overnight events/patient complaints:  PT feels well without complaints    ACE inhibitors (Angioedema)  morphine (Hives; Nausea)    MEDICATIONS  (STANDING):  aspirin enteric coated 81 milliGRAM(s) Oral daily  atorvastatin 40 milliGRAM(s) Oral at bedtime  dextrose 5%. 1000 milliLiter(s) (100 mL/Hr) IV Continuous <Continuous>  dextrose 5%. 1000 milliLiter(s) (50 mL/Hr) IV Continuous <Continuous>  dextrose 50% Injectable 25 Gram(s) IV Push once  dextrose 50% Injectable 25 Gram(s) IV Push once  dextrose 50% Injectable 12.5 Gram(s) IV Push once  epoetin cris-epbx (RETACRIT) Injectable 40432 Unit(s) SubCutaneous <User Schedule>  ferrous    sulfate 325 milliGRAM(s) Oral daily  gabapentin 300 milliGRAM(s) Oral two times a day  glucagon  Injectable 1 milliGRAM(s) IntraMuscular once  insulin lispro (ADMELOG) corrective regimen sliding scale   SubCutaneous Before meals and at bedtime  levothyroxine 75 MICROGram(s) Oral daily  loratadine 10 milliGRAM(s) Oral daily  pantoprazole    Tablet 40 milliGRAM(s) Oral daily  polyethylene glycol 3350 17 Gram(s) Oral daily  sodium chloride 0.9% lock flush 3 milliLiter(s) IV Push every 8 hours  sodium chloride 0.9%. 1000 milliLiter(s) (10 mL/Hr) IV Continuous <Continuous>  sodium chloride 0.9%. 500 milliLiter(s) (75 mL/Hr) IV Continuous <Continuous>    MEDICATIONS  (PRN):  dextrose Oral Gel 15 Gram(s) Oral once PRN Blood Glucose LESS THAN 70 milliGRAM(s)/deciliter  hydrALAZINE Injectable 5 milliGRAM(s) IV Push every 6 hours PRN SBP > 170      Vital Signs Last 24 Hrs  T(C): 36.3 (01 Dec 2023 08:04), Max: 37.1 (30 Nov 2023 20:48)  T(F): 97.4 (01 Dec 2023 08:04), Max: 98.7 (30 Nov 2023 20:48)  HR: 72 (01 Dec 2023 08:04) (70 - 86)  BP: 149/72 (01 Dec 2023 08:04) (106/59 - 153/69)  BP(mean): 74 (30 Nov 2023 12:15) (74 - 74)  RR: 16 (01 Dec 2023 08:04) (15 - 18)  SpO2: 98% (01 Dec 2023 08:04) (95% - 99%)    Parameters below as of 01 Dec 2023 08:04  Patient On (Oxygen Delivery Method): room air      ICU Vital Signs Last 24 Hrs  ERNESTO JAY  I&O's Detail    30 Nov 2023 07:01  -  01 Dec 2023 07:00  --------------------------------------------------------  IN:    Oral Fluid: 900 mL    sodium chloride 0.9%: 525 mL  Total IN: 1425 mL    OUT:    Indwelling Catheter - Urethral (mL): 900 mL    Voided (mL): 1700 mL  Total OUT: 2600 mL    Total NET: -1175 mL      01 Dec 2023 07:01  -  01 Dec 2023 11:06  --------------------------------------------------------  IN:    Oral Fluid: 240 mL  Total IN: 240 mL    OUT:  Total OUT: 0 mL    Total NET: 240 mL        Drug Dosing Weight  ERNESTO JAY      PHYSICAL EXAM:  General:  alert and cooperative.  HEENT: Head; normocephalic, atraumatic.  Eyes: Pupils reactive, cornea wnl.  Neck: Supple, no nodes adenopathy, no NVD or carotid bruit or thyromegaly.  CARDIOVASCULAR: Normal S1 and S2, No murmur, rub, gallop or lift.   LUNGS: No rales, rhonchi or wheeze. Normal breath sounds bilaterally.  ABDOMEN: Soft, nontender without mass or organomegaly. bowel sounds normoactive.  EXTREMITIES: No clubbing, cyanosis or edema. Distal pulses wnl. groin benign  SKIN: warm and dry with normal turgor.  NEURO: Alert/oriented x 3/normal motor exam. No pathologic reflexes.    PSYCH: normal affect.        LABS:                        9.5    7.91  )-----------( 250      ( 01 Dec 2023 05:00 )             28.6     12-01    137  |  100  |  63.0<H>  ----------------------------<  89  3.9   |  26.0  |  2.88<H>    Ca    8.3<L>      01 Dec 2023 05:00  Mg     1.8     12-01    TPro  6.4<L>  /  Alb  3.1<L>  /  TBili  0.3<L>  /  DBili  x   /  AST  19  /  ALT  8   /  AlkPhos  66  11-29    ERNESTO JAY  CARDIAC MARKERS ( 30 Nov 2023 01:32 )  x     / x     / 65 U/L / x     / x      CARDIAC MARKERS ( 29 Nov 2023 17:52 )  x     / x     / 60 U/L / x     / x          PT/INR - ( 30 Nov 2023 01:32 )   PT: 16.4 sec;   INR: 1.49 ratio         PTT - ( 30 Nov 2023 01:32 )  PTT:44.3 sec  Urinalysis Basic - ( 01 Dec 2023 05:00 )    Color: x / Appearance: x / SG: x / pH: x  Gluc: 89 mg/dL / Ketone: x  / Bili: x / Urobili: x   Blood: x / Protein: x / Nitrite: x   Leuk Esterase: x / RBC: x / WBC x   Sq Epi: x / Non Sq Epi: x / Bacteria: x        RADIOLOGY & ADDITIONAL STUDIES:    INTERPRETATION OF TELEMETRY (personally reviewed): no events         ECHO: < from: TTE Echo Complete w/ Contrast w/ Doppler (11.29.23 @ 19:02) >  Summary:   1. Left ventricular ejection fraction, by visual estimation, is 60 to   65%.   2. Normal global left ventricular systolic function.   3. Spectral Doppler shows pseudonormal pattern of left ventricular   myocardial filling (Grade II diastolic dysfunction).   4. Mild mitral valve regurgitation.   5. A 23mm Davey Trevor TAVR is visualized in the aortic position. Low   seated. Appears deep into LVOT.   6. AT=73.94msec.   7. Estimated pulmonary artery systolic pressure is 43.2 mmHg assuming a   right atrial pressure of 3 mmHg, which is consistent with mild pulmonary   hypertension.   8. The Dimesionless Index value is .74.   9. Results d/w JOO Hicks.    MD Hemal Electronically signed on 11/30/2023 at 3:11:45 PM    < end of copied text >         ASSESSMENT AND PLAN:  In summary, ERNESTO JAY is an 78y Female with past medical history significant for preDM, DVT and PE on Eliquis HTN, hypothyroidism, HLD, CAD s/p PCI 6/2023 on ASA and plavix s/p TF TAVR 11/29/23 with ARF     On ASA  Continue statin  prior AF on Eliquis can restart when ok with CT surgery     Renal input requested cr 2.8 and improving  Plan discussed with CTS team        
Subjective - patient seen and evaluated bedside. Sitting comfortably in bed. Denies CP, SOB, HA, dizziness, n/v/d    Review of Systems: negative x 10 systems except as noted above    Brief summary:  78yFemale POD# 3 TF-TAVR c/b RAMÍREZ on CKD    Significant/[id number] events: RAMÍREZ, resolving      PAST MEDICAL & SURGICAL HISTORY:  Hypertension      Prediabetes      Breast cancer  2006, surgery, radiation and hormone therapy      Hypothyroidism      Hyperlipidemia      Skin cancer      COVID-19 vaccine series completed      Nonrheumatic aortic valve stenosis      H/O hyperkalemia      CAD S/P percutaneous coronary angioplasty      DVT (deep venous thrombosis)      Pulmonary embolism      Former smoker      H/O ankylosing spondylitis      History of cholecystectomy  1979      H/O total hysterectomy  1987      H/O laminectomy  lumbar 1992      History of lumpectomy  left 2006      S/P tonsillectomy            aspirin enteric coated 81 milliGRAM(s) Oral daily  atorvastatin 40 milliGRAM(s) Oral at bedtime  dextrose 5%. 1000 milliLiter(s) IV Continuous <Continuous>  dextrose 5%. 1000 milliLiter(s) IV Continuous <Continuous>  dextrose 50% Injectable 25 Gram(s) IV Push once  dextrose 50% Injectable 25 Gram(s) IV Push once  dextrose 50% Injectable 12.5 Gram(s) IV Push once  dextrose Oral Gel 15 Gram(s) Oral once PRN  epoetin cris-epbx (RETACRIT) Injectable 75081 Unit(s) SubCutaneous <User Schedule>  ferrous    sulfate 325 milliGRAM(s) Oral daily  gabapentin 300 milliGRAM(s) Oral two times a day  glucagon  Injectable 1 milliGRAM(s) IntraMuscular once  hydrALAZINE Injectable 5 milliGRAM(s) IV Push every 6 hours PRN  insulin lispro (ADMELOG) corrective regimen sliding scale   SubCutaneous Before meals and at bedtime  levothyroxine 75 MICROGram(s) Oral daily  loratadine 10 milliGRAM(s) Oral daily  pantoprazole    Tablet 40 milliGRAM(s) Oral daily  polyethylene glycol 3350 17 Gram(s) Oral daily  sodium chloride 0.9% lock flush 3 milliLiter(s) IV Push every 8 hours  MEDICATIONS  (PRN):  dextrose Oral Gel 15 Gram(s) Oral once PRN Blood Glucose LESS THAN 70 milliGRAM(s)/deciliter  hydrALAZINE Injectable 5 milliGRAM(s) IV Push every 6 hours PRN SBP > 170      Daily     Daily                               9.5    7.91  )-----------( 250      ( 01 Dec 2023 05:00 )             28.6   12-01    138  |  101  |  50.7<H>  ----------------------------<  144<H>  4.0   |  23.0  |  2.20<H>    Ca    8.2<L>      01 Dec 2023 16:23  Mg     1.8     12-01      CARDIAC MARKERS ( 30 Nov 2023 01:32 )  x     / x     / 65 U/L / x     / x        PT/INR - ( 30 Nov 2023 01:32 )   PT: 16.4 sec;   INR: 1.49 ratio         PTT - ( 30 Nov 2023 01:32 )  PTT:44.3 sec      Objective:  T(C): 36.9 (12-01-23 @ 19:48), Max: 36.9 (12-01-23 @ 19:48)  HR: 63 (12-01-23 @ 19:48) (63 - 87)  BP: 138/79 (12-01-23 @ 19:48) (134/69 - 149/72)  RR: 20 (12-01-23 @ 19:48) (16 - 20)  SpO2: 99% (12-01-23 @ 19:48) (96% - 100%)  Wt(kg): --CAPILLARY BLOOD GLUCOSE      POCT Blood Glucose.: 149 mg/dL (01 Dec 2023 17:01)  POCT Blood Glucose.: 115 mg/dL (01 Dec 2023 11:56)  I&O's Summary    30 Nov 2023 07:01  -  01 Dec 2023 07:00  --------------------------------------------------------  IN: 1425 mL / OUT: 2600 mL / NET: -1175 mL    01 Dec 2023 07:01  -  02 Dec 2023 01:09  --------------------------------------------------------  IN: 720 mL / OUT: 850 mL / NET: -130 mL        Physical Exam  General: NAD  Neuro: A+O x 3, non-focal, speech clear and intact  Psych: Appropriate affect  HEENT:  NCAT, No conjuctival edema or icterus, no thrush.  Pulm: CTA, equal bilaterally  CV: RRR, +S1S2  Abd: soft, NT, ND, +BS  Ext: +DP Pulses b/l, no edema  Skin: Warm, dry, intact  Inc: b/l groin sites c/d/i, no evidence of hematoma or infection        Imaging:    < from: Xray Chest 1 View- PORTABLE-Routine (12.01.23 @ 05:16) >  PROCEDURE DATE:  12/01/2023          INTERPRETATION:  Chest one view    HISTORY: Postop    COMPARISON STUDY: 11/30/2023    Frontal expiratory view of the chest shows the heart to be similar in   size. Aortic valve stent is again noted.    The lungs show clearing of the bases with reduced effusions and there is   no evidence of pneumothorax.    IMPRESSION:  Basilar clearing.      Thank you for the courtesy of this referral.    < end of copied text >

## 2023-12-02 NOTE — PROGRESS NOTE ADULT - PROBLEM SELECTOR PLAN 2
Cr 1.87 on PST labs 11/10  now in the 4s, improving  mendez placed  renal consult pending
Cr 1.87 on PST labs 11/10  Downtrending  mendez placed  renal consult appreciated
Cr 1.87 on PST labs 11/10  now in the 4s  mendez placed  renal consult pending

## 2023-12-03 ENCOUNTER — TRANSCRIPTION ENCOUNTER (OUTPATIENT)
Age: 78
End: 2023-12-03

## 2023-12-04 ENCOUNTER — APPOINTMENT (OUTPATIENT)
Dept: CARE COORDINATION | Facility: HOME HEALTH | Age: 78
End: 2023-12-04

## 2023-12-04 VITALS
RESPIRATION RATE: 16 BRPM | SYSTOLIC BLOOD PRESSURE: 116 MMHG | HEART RATE: 72 BPM | HEIGHT: 60 IN | DIASTOLIC BLOOD PRESSURE: 68 MMHG | WEIGHT: 129 LBS | OXYGEN SATURATION: 95 % | BODY MASS INDEX: 25.32 KG/M2

## 2023-12-04 DIAGNOSIS — M54.50 LOW BACK PAIN, UNSPECIFIED: ICD-10-CM

## 2023-12-04 DIAGNOSIS — G89.29 LOW BACK PAIN, UNSPECIFIED: ICD-10-CM

## 2023-12-04 PROBLEM — I25.10 ATHEROSCLEROTIC HEART DISEASE OF NATIVE CORONARY ARTERY WITHOUT ANGINA PECTORIS: Chronic | Status: ACTIVE | Noted: 2023-11-10

## 2023-12-04 PROBLEM — I26.99 OTHER PULMONARY EMBOLISM WITHOUT ACUTE COR PULMONALE: Chronic | Status: ACTIVE | Noted: 2023-11-10

## 2023-12-04 PROBLEM — I35.0 NONRHEUMATIC AORTIC (VALVE) STENOSIS: Chronic | Status: ACTIVE | Noted: 2023-11-10

## 2023-12-04 PROBLEM — Z87.39 PERSONAL HISTORY OF OTHER DISEASES OF THE MUSCULOSKELETAL SYSTEM AND CONNECTIVE TISSUE: Chronic | Status: ACTIVE | Noted: 2023-11-10

## 2023-12-04 PROBLEM — C44.90 UNSPECIFIED MALIGNANT NEOPLASM OF SKIN, UNSPECIFIED: Chronic | Status: ACTIVE | Noted: 2023-11-10

## 2023-12-04 PROBLEM — E78.5 HYPERLIPIDEMIA, UNSPECIFIED: Chronic | Status: ACTIVE | Noted: 2023-11-10

## 2023-12-04 PROBLEM — Z87.891 PERSONAL HISTORY OF NICOTINE DEPENDENCE: Chronic | Status: ACTIVE | Noted: 2023-11-10

## 2023-12-04 PROBLEM — I82.409 ACUTE EMBOLISM AND THROMBOSIS OF UNSPECIFIED DEEP VEINS OF UNSPECIFIED LOWER EXTREMITY: Chronic | Status: ACTIVE | Noted: 2023-11-10

## 2023-12-04 RX ORDER — ATORVASTATIN CALCIUM 40 MG/1
40 TABLET, FILM COATED ORAL
Refills: 0 | Status: ACTIVE | COMMUNITY

## 2023-12-04 RX ORDER — CETIRIZINE HYDROCHLORIDE 10 MG/1
10 TABLET, COATED ORAL
Refills: 0 | Status: ACTIVE | COMMUNITY

## 2023-12-04 RX ORDER — TRAMADOL HYDROCHLORIDE 100 MG/1
100 TABLET, COATED ORAL
Refills: 0 | Status: ACTIVE | COMMUNITY

## 2023-12-04 RX ORDER — GABAPENTIN 300 MG/1
300 CAPSULE ORAL
Refills: 0 | Status: ACTIVE | COMMUNITY

## 2023-12-04 RX ORDER — SACUBITRIL AND VALSARTAN 24; 26 MG/1; MG/1
24-26 TABLET, FILM COATED ORAL
Refills: 0 | Status: DISCONTINUED | COMMUNITY
End: 2023-12-04

## 2023-12-04 RX ORDER — CLOPIDOGREL 75 MG/1
75 TABLET, FILM COATED ORAL
Refills: 0 | Status: DISCONTINUED | COMMUNITY
End: 2023-12-04

## 2023-12-04 RX ORDER — FERROUS SULFATE TAB EC 324 MG (65 MG FE EQUIVALENT) 324 (65 FE) MG
324 (65 FE) TABLET DELAYED RESPONSE ORAL
Refills: 0 | Status: ACTIVE | COMMUNITY

## 2023-12-07 ENCOUNTER — NON-APPOINTMENT (OUTPATIENT)
Age: 78
End: 2023-12-07

## 2023-12-13 PROCEDURE — 83735 ASSAY OF MAGNESIUM: CPT

## 2023-12-13 PROCEDURE — 80053 COMPREHEN METABOLIC PANEL: CPT

## 2023-12-13 PROCEDURE — 85027 COMPLETE CBC AUTOMATED: CPT

## 2023-12-13 PROCEDURE — 76000 FLUOROSCOPY <1 HR PHYS/QHP: CPT

## 2023-12-13 PROCEDURE — 71045 X-RAY EXAM CHEST 1 VIEW: CPT

## 2023-12-13 PROCEDURE — 85610 PROTHROMBIN TIME: CPT

## 2023-12-13 PROCEDURE — 93320 DOPPLER ECHO COMPLETE: CPT

## 2023-12-13 PROCEDURE — C1894: CPT

## 2023-12-13 PROCEDURE — C1889: CPT

## 2023-12-13 PROCEDURE — 82728 ASSAY OF FERRITIN: CPT

## 2023-12-13 PROCEDURE — 82803 BLOOD GASES ANY COMBINATION: CPT

## 2023-12-13 PROCEDURE — 84295 ASSAY OF SERUM SODIUM: CPT

## 2023-12-13 PROCEDURE — 80048 BASIC METABOLIC PNL TOTAL CA: CPT

## 2023-12-13 PROCEDURE — 83540 ASSAY OF IRON: CPT

## 2023-12-13 PROCEDURE — 85730 THROMBOPLASTIN TIME PARTIAL: CPT

## 2023-12-13 PROCEDURE — 84132 ASSAY OF SERUM POTASSIUM: CPT

## 2023-12-13 PROCEDURE — C1769: CPT

## 2023-12-13 PROCEDURE — 84484 ASSAY OF TROPONIN QUANT: CPT

## 2023-12-13 PROCEDURE — 83550 IRON BINDING TEST: CPT

## 2023-12-13 PROCEDURE — C1887: CPT

## 2023-12-13 PROCEDURE — 83605 ASSAY OF LACTIC ACID: CPT

## 2023-12-13 PROCEDURE — 93005 ELECTROCARDIOGRAM TRACING: CPT

## 2023-12-13 PROCEDURE — 82962 GLUCOSE BLOOD TEST: CPT

## 2023-12-13 PROCEDURE — 85014 HEMATOCRIT: CPT

## 2023-12-13 PROCEDURE — C1760: CPT

## 2023-12-13 PROCEDURE — 85018 HEMOGLOBIN: CPT

## 2023-12-13 PROCEDURE — 36415 COLL VENOUS BLD VENIPUNCTURE: CPT

## 2023-12-13 PROCEDURE — 84466 ASSAY OF TRANSFERRIN: CPT

## 2023-12-13 PROCEDURE — C8929: CPT

## 2023-12-13 PROCEDURE — 82550 ASSAY OF CK (CPK): CPT

## 2023-12-13 PROCEDURE — 82435 ASSAY OF BLOOD CHLORIDE: CPT

## 2023-12-13 PROCEDURE — 82947 ASSAY GLUCOSE BLOOD QUANT: CPT

## 2023-12-13 PROCEDURE — 93325 DOPPLER ECHO COLOR FLOW MAPG: CPT

## 2023-12-13 PROCEDURE — C9399: CPT

## 2023-12-13 PROCEDURE — 85025 COMPLETE CBC W/AUTO DIFF WBC: CPT

## 2023-12-13 PROCEDURE — 93312 ECHO TRANSESOPHAGEAL: CPT

## 2023-12-13 PROCEDURE — 84100 ASSAY OF PHOSPHORUS: CPT

## 2023-12-13 PROCEDURE — 82330 ASSAY OF CALCIUM: CPT

## 2023-12-13 PROCEDURE — L8699: CPT

## 2023-12-15 ENCOUNTER — RESULT REVIEW (OUTPATIENT)
Age: 78
End: 2023-12-15

## 2023-12-15 ENCOUNTER — OUTPATIENT (OUTPATIENT)
Dept: OUTPATIENT SERVICES | Facility: HOSPITAL | Age: 78
LOS: 1 days | End: 2023-12-15
Payer: MEDICARE

## 2023-12-15 ENCOUNTER — APPOINTMENT (OUTPATIENT)
Dept: CARDIOTHORACIC SURGERY | Facility: CLINIC | Age: 78
End: 2023-12-15
Payer: MEDICARE

## 2023-12-15 VITALS
HEIGHT: 60 IN | HEART RATE: 67 BPM | TEMPERATURE: 97.9 F | OXYGEN SATURATION: 98 % | SYSTOLIC BLOOD PRESSURE: 141 MMHG | DIASTOLIC BLOOD PRESSURE: 59 MMHG | BODY MASS INDEX: 25.6 KG/M2 | RESPIRATION RATE: 16 BRPM | WEIGHT: 130.4 LBS

## 2023-12-15 DIAGNOSIS — Z90.710 ACQUIRED ABSENCE OF BOTH CERVIX AND UTERUS: Chronic | ICD-10-CM

## 2023-12-15 DIAGNOSIS — Z98.890 OTHER SPECIFIED POSTPROCEDURAL STATES: Chronic | ICD-10-CM

## 2023-12-15 DIAGNOSIS — Z90.49 ACQUIRED ABSENCE OF OTHER SPECIFIED PARTS OF DIGESTIVE TRACT: Chronic | ICD-10-CM

## 2023-12-15 DIAGNOSIS — J90 PLEURAL EFFUSION, NOT ELSEWHERE CLASSIFIED: ICD-10-CM

## 2023-12-15 DIAGNOSIS — Z90.89 ACQUIRED ABSENCE OF OTHER ORGANS: Chronic | ICD-10-CM

## 2023-12-15 PROCEDURE — 71046 X-RAY EXAM CHEST 2 VIEWS: CPT

## 2023-12-15 PROCEDURE — 99213 OFFICE O/P EST LOW 20 MIN: CPT

## 2023-12-15 PROCEDURE — 71046 X-RAY EXAM CHEST 2 VIEWS: CPT | Mod: 26

## 2023-12-15 RX ORDER — GABAPENTIN 100 MG/1
100 CAPSULE ORAL
Refills: 0 | Status: COMPLETED | COMMUNITY
End: 2023-12-15

## 2023-12-15 RX ORDER — METOPROLOL SUCCINATE 50 MG/1
50 TABLET, EXTENDED RELEASE ORAL
Refills: 0 | Status: ACTIVE | COMMUNITY

## 2023-12-15 RX ORDER — FUROSEMIDE 80 MG/1
TABLET ORAL
Refills: 0 | Status: ACTIVE | COMMUNITY

## 2023-12-15 NOTE — REASON FOR VISIT
[de-identified] : Percutaneous Transfemoral TAVR via Right Common Femoral Artery (23mm Trevor Resilia) [de-identified] : 11/29/2023 [de-identified] : 78F, pmhx severe AS, Mod MR/TR, CAD/ICM (EF 25% with mod RV Dysfunction in 6/2023), PCI to p&mLAD (9/1/23 with improvement in EF 50% with NL RV 10/27/23 TTE), Chronic Combined Systolic & Diastolic CHF, HTN, HLD, RBBB, DVT/PE (on Eliquis), PAD (Infrarenal Stenosis > 50%), Former Smoker, Left Breast Ca s/p Lumpectomy / Radiation / Hormone (2006), Covid Infection, Stage 4 CKD, Left Renal Cyst, DM (A1C 5.5), Cholecystectomy, Diverticulosis, Dilated Appendix (? Mucocele-pt aware), Thyroid Nodules, Hypothyroid, T12 Compression Fx (wears  TLSO brace), Lumbar Laminectomy with CBP, Skin Ca (Melanoma & Basal Cell) now s/p percutaneous TF-TAVR via RCFA (cathy) 11/29/23 with Dr. Shelton. Postop course notable for RAMÍREZ, mendez placed, renal consulted, improved with hydration. As of 12/2 Creatinine is back to baseline levels. Pt hemodynamically stable and ambulating well. Pt stable for discharge home as per Dr. Shelton.  Today the patient reports feeling well. Patient denies chest pain, palpitations, shortness of breath, cough, fevers, chills, fatigue and unintentional weight loss or gain.   All 10 review of symptoms negative unless specified above.

## 2023-12-15 NOTE — PHYSICAL EXAM
[Respiration, Rhythm And Depth] : normal respiratory rhythm and effort [Auscultation Breath Sounds / Voice Sounds] : lungs were clear to auscultation bilaterally [Heart Rate And Rhythm] : heart rate was normal and rhythm regular [Heart Sounds] : normal S1 and S2 [Murmurs] : no murmurs [Clean] : clean [Dry] : dry [Healing Well] : healing well [No Edema] : no edema

## 2023-12-15 NOTE — DISCUSSION/SUMMARY
[FreeTextEntry1] : < from: TTE Echo Complete w/ Contrast w/ Doppler (11.29.23 @ 19:02) >    Summary:   1. Left ventricular ejection fraction, by visual estimation, is 60 to  65%.   2. Normal global left ventricular systolic function.   3. Spectral Doppler shows pseudonormal pattern of left ventricular  myocardial filling (Grade II diastolic dysfunction).   4. Mild mitral valve regurgitation.   5. A 23mm Davey Trevor TAVR is visualized in the aortic position. Low  seated. Appears deep into LVOT.   6. AT=73.94msec.   7. Estimated pulmonary artery systolic pressure is 43.2 mmHg assuming a  right atrial pressure of 3 mmHg, which is consistent with mild pulmonary  hypertension.   8. The Dimesionless Index value is .74.   9. Results d/w JOO Hicks.    MD Hemal Electronically signed on 11/30/2023 at 3:11:45 PM    < end of copied text >

## 2023-12-15 NOTE — CONSULT LETTER
[Dear  ___] : Dear  [unfilled], [Courtesy Letter:] : I had the pleasure of seeing your patient, [unfilled], in my office today. [Please see my note below.] : Please see my note below. [Consult Closing:] : Thank you very much for allowing me to participate in the care of this patient.  If you have any questions, please do not hesitate to contact me. [Sincerely,] : Sincerely, [FreeTextEntry2] : Margarito Stevens MD [FreeTextEntry3] : James Shelton MD Chief of Cardiovascular and Thoracic Surgery System Director of Endovascular and Cardiovascular Surgery , Cardiovascular and Thoracic Surgery [facility], [facility]

## 2023-12-15 NOTE — ASSESSMENT
[FreeTextEntry1] : I had the pleasure of seeing Ms. ERNESTO JAY  in the office today following her transcatheter aortic valve replacement.  Briefly, Ms. JAY  is an 78 year old female who is status post transfemoral transcatheter aortic valve replacement on 11/29/2023. The postoperative course was notable for acute kidney injury. Since discharge, Ms. JAY  has been continuously improving and at this time has no complaints.  Exam was benign with well healing incisions and MCOT review was notable for the SVT. Her cardiologist has increased her metoprolol.  During the visit, we discussed the importance of increasing activity and exercise as tolerated. We discussed the need for antibiotic prophylaxis with procedures such as dental work and colonoscopy or endoscopy. We recommend waiting a full six months before undergoing any dental procedure or cleaning. The patient should maintain the MCOT device as instructed for a total of 30 days.  Overall, I am happy with Ms. JAY's  progress. All questions were answered and concerns were addressed. I encouraged the patient to call the office with any other concerns.  In summary: - Continue current medications - May drive and lift without physical restriction - 30 day requirements post-TAVR: labs including complete blood count and basic metabolic panel, electrocardiogram and echocardiogram and may be completed in our office or the cardiologist's office. - Increase activity as tolerated - Eat a heart healthy diet - Follow up with cardiologist, Dr. Stevens  I, Dr. James Shelton, personally performed the evaluation and management (E/M) services for this established patient who presents today with an existing condition.  That E/M includes conducting the examination, assessing all conditions, and establishing a new plan of care.  Today, El Jim PA-C, was here to observe my evaluation and management services for this/these condition(s) to be followed going forward.

## 2024-01-26 ENCOUNTER — APPOINTMENT (OUTPATIENT)
Dept: CARDIOTHORACIC SURGERY | Facility: CLINIC | Age: 79
End: 2024-01-26
Payer: MEDICARE

## 2024-01-26 VITALS
DIASTOLIC BLOOD PRESSURE: 73 MMHG | TEMPERATURE: 98.2 F | HEART RATE: 74 BPM | WEIGHT: 132 LBS | SYSTOLIC BLOOD PRESSURE: 98 MMHG | BODY MASS INDEX: 25.91 KG/M2 | HEIGHT: 60 IN | OXYGEN SATURATION: 97 % | RESPIRATION RATE: 16 BRPM

## 2024-01-26 DIAGNOSIS — R10.30 LOWER ABDOMINAL PAIN, UNSPECIFIED: ICD-10-CM

## 2024-01-26 DIAGNOSIS — Z95.2 PRESENCE OF PROSTHETIC HEART VALVE: ICD-10-CM

## 2024-01-26 DIAGNOSIS — I35.0 NONRHEUMATIC AORTIC (VALVE) STENOSIS: ICD-10-CM

## 2024-01-26 PROCEDURE — 99213 OFFICE O/P EST LOW 20 MIN: CPT

## 2024-01-26 NOTE — REASON FOR VISIT
[de-identified] : Percutaneous Transfemoral TAVR via Right Common Femoral Artery (23mm Trevor Resilia) [de-identified] : 11/29/2023 [de-identified] : 78F with complex medical history underwent the above procedure with postoperative acute kidney injury, which improved with hydration. She presents for follow up again today to discuss ongoing left groin pain.  Today she reports no lumps or bruising but has a sharp pulling pain which happens any time she gets up to move or is walking and now needs a cane. She does take tramadol and gabapentin also for back pain but it does help with the groin pain, especially the tramadol. On a scale of 1-10, when she gets the sharp pain, she quotes it at an "11 or more." This occurs throughout the day but worse in the morning and evening. Denies shortness of breath, fever, chills, unintentional weight loss/gain, and night sweats.

## 2024-01-26 NOTE — PHYSICAL EXAM
[Respiration, Rhythm And Depth] : normal respiratory rhythm and effort [Heart Rate And Rhythm] : heart rate was normal and rhythm regular [Heart Sounds] : normal S1 and S2 [Clean] : clean [Dry] : dry [Healing Well] : healing well [No Edema] : no edema [FreeTextEntry3] : no hematomas noted, some tenderness left groin to deep palpation. Stab sites bilateral groins healed. (sheath site was right, pain is left) [FreeTextEntry1] : 2/6 systolic murmur

## 2024-01-26 NOTE — CONSULT LETTER
[Dear  ___] : Dear  [unfilled], [Courtesy Letter:] : I had the pleasure of seeing your patient, [unfilled], in my office today. [Please see my note below.] : Please see my note below. [Consult Closing:] : Thank you very much for allowing me to participate in the care of this patient.  If you have any questions, please do not hesitate to contact me. [Sincerely,] : Sincerely, [FreeTextEntry2] : Margarito Stevens MD [FreeTextEntry3] : James Shelton MD Chief of Cardiovascular and Thoracic Surgery System Director of Endovascular and Cardiovascular Surgery , Cardiovascular and Thoracic Surgery [facility], [facility]

## 2024-01-26 NOTE — ASSESSMENT
[FreeTextEntry1] : I had the pleasure of seeing Ms. JAY in the office today to discuss ongoing groin pain.  Briefly, this is a 78 year old female status post transcatheter aortic valve replacement 8 weeks ago who reports sharp pain at times in the medial left groin. The sheath for the procedure had been placed in the right femoral artery however, the left groin was also accessed for a smaller pigtail catheter. The pain she describes is medial to the access point, however, it is the possibility of nerve sheath irritation or local swelling which could cause pain. There is not obvious hematoma however, to be complete, we will order an ultrasound to evaluate for any disorders of the vasculature or pseudoaneurysm.  I have fully reviewed and evaluated all available results. All questions were answered and concerns were addressed.   Plan: - Left groin ultrasound to rule out pseudoaneurysm - We will call with result, otherwise she can follow up with her cardiologist per routine - Patient is in full understanding and agreement with the plan going forward.  I, Dr. James Shelton, personally performed the evaluation and management (E/M) services for this established patient who presents today with an existing condition.  That E/M includes conducting the examination, assessing all conditions, and establishing a new plan of care.  Today, El Jim PA-C, was here to observe my evaluation and management services for this/these condition(s) to be followed going forward.

## 2024-01-31 ENCOUNTER — OUTPATIENT (OUTPATIENT)
Dept: OUTPATIENT SERVICES | Facility: HOSPITAL | Age: 79
LOS: 1 days | End: 2024-01-31
Payer: MEDICARE

## 2024-01-31 ENCOUNTER — APPOINTMENT (OUTPATIENT)
Dept: ULTRASOUND IMAGING | Facility: CLINIC | Age: 79
End: 2024-01-31
Payer: MEDICARE

## 2024-01-31 DIAGNOSIS — Z95.2 PRESENCE OF PROSTHETIC HEART VALVE: ICD-10-CM

## 2024-01-31 DIAGNOSIS — Z90.89 ACQUIRED ABSENCE OF OTHER ORGANS: Chronic | ICD-10-CM

## 2024-01-31 PROCEDURE — 93926 LOWER EXTREMITY STUDY: CPT

## 2024-01-31 PROCEDURE — 93926 LOWER EXTREMITY STUDY: CPT | Mod: 26,LT

## 2025-04-28 NOTE — BRIEF OPERATIVE NOTE - PRIMARY SURGEON
[facility] MEDICARE WELLNESS VISIT    Patient Care Team:  D'Amico, Michael D, MD as PCP - General (Family Practice)  Clara Lazo as Internal Medicine- Interventional Cardiology (Cardiology)  Elie Herring MD as Internal Medicine- Interventional Cardiology (Internal Medicine- Interventional Cardiology)    Naveed presents for his Subsequent Annual Medicare Wellness Visit with Office Visit (Fasting for Labs today) and Medicare Wellness Visit (Subsequent)   A physical was done separately.    Status MWV Topics Details (Refresh Note to Update)    Depression Screening (Trend)   PHQ2 Interpretation Negative          PHQ2: Score = 0      Depression screening is negative no further plan needed.    Blood Pressure  Weight  BMI     /60  Current Weight 324 lbs  body mass index is 47.84 kg/m².  BMI is in obese range.    30 minutes of physical activity a day and Caloric restriction         Advanced Directives on File Yes on file.        Health Risk Assessment  (Click to Review or Edit)   Completed      Falls Risk  Have you had a fall in the past year?................................. No  Do you feel unsteady when standing or walking?........Yes.  Do you worry about falling?.................................................. No       Tobacco/Alcohol/Drugs Former Smoker (Quit 01/28/1991) Tobacco Pack Years 22.5   reports current alcohol use of about 1.0 standard drink of alcohol per week.   reports no history of drug use.      Opioid Review (Update Meds)    No opioid on med list.      Cognitive/Functional Status  (Optional MOCA  Mini Cog)   Preexisting cognitive issues - stable.    Vision and Hearing Screens    Not performed      Health Maintenance (Link)  See patient instructions and orders           The following items on the Medicare Health Risk Assessment were found to be positive  3.) During the past 4 weeks, how would you rate your health?: Fair     4.) During the past 4 weeks, what was the hardest physical activity you could  do for at least 2 minutes?: Light     5.) Do you do moderate to strenuous exercise (brisk walk) for about 20 minutes for 3 or more days per week?: No, I usually do not exercise this much     6 b.) How many servings of High Fiber / Whole Grain Foods to you have each day ( 1 serving = 1 cup cold cereal, 1/2 cup cooked cereal, 1 slice bread): 1 per day     7b.) Do you feel unsteady when standing or walking?: Yes     11d.) Bodily pain: Often     11e.) Tiredness or Fatigue: Always     11l.) Sexual Problems: Always       I reviewed and updated the past Medical, Surgical, Family, Social History, Allergies and Medications in Epic: Yes    Family History   Problem Relation Age of Onset   • Cancer, Ovarian Mother    • Cancer Mother    • Patient is unaware of any medical problems Father    • Cancer Sister    • Obesity Sister    • Diabetes Sister    • Myocardial Infarction Sister    • Patient is unaware of any medical problems Brother    • Heart Brother         AICD       Consider physical therapy evaluation and treatment for fall risk reduction.  Return in about 1 year (around 4/28/2026) for Medicare Wellness Visit.

## (undated) DEVICE — DRAPE TOWEL BLUE 17" X 24"

## (undated) DEVICE — SYR LUER LOK 20CC

## (undated) DEVICE — DRAPE DOME BAG 22"

## (undated) DEVICE — PACK MINOR WITH LAP

## (undated) DEVICE — VISITEC 4X4

## (undated) DEVICE — PACK BASIC GOWN

## (undated) DEVICE — SUT SILK 0 30" SH

## (undated) DEVICE — GOWN TRIMAX LG

## (undated) DEVICE — GLV 7.5 PROTEXIS (WHITE)

## (undated) DEVICE — ELCTR MULTIFUNCTION DEFIBRILLATION ELECTRODE EDGE SYSTEM ADULT

## (undated) DEVICE — PREP SCRUB BRUSH W CHG 4%

## (undated) DEVICE — SOL IRR POUR NS 0.9% 1000ML

## (undated) DEVICE — POSITIONER FOAM EGG CRATE ULNAR 2PCS (PINK)

## (undated) DEVICE — DRAPE CV 106" X 135"

## (undated) DEVICE — SUT MONOCRYL 4-0 27" PS-2 UNDYED

## (undated) DEVICE — SOL INJ NS 0.9% 1000ML

## (undated) DEVICE — DRSG MASTISOL

## (undated) DEVICE — DRSG MEPILEX 10 X 30CM (4 X 12") WHITE

## (undated) DEVICE — PREP CHLORAPREP HI-LITE ORANGE 26ML

## (undated) DEVICE — DRAPE 3/4 SHEET 52X76"

## (undated) DEVICE — WARMING BLANKET FULL UNDERBODY

## (undated) DEVICE — WOUND IRR IRRISEPT W 0.5 CHG

## (undated) DEVICE — DRAPE C ARM UNIVERSAL

## (undated) DEVICE — DRSG TEGADERM 4X4.75"